# Patient Record
Sex: FEMALE | Race: WHITE | Employment: OTHER | URBAN - METROPOLITAN AREA
[De-identification: names, ages, dates, MRNs, and addresses within clinical notes are randomized per-mention and may not be internally consistent; named-entity substitution may affect disease eponyms.]

---

## 2017-10-16 PROBLEM — E55.9 VITAMIN D DEFICIENCY: Status: ACTIVE | Noted: 2017-10-16

## 2017-10-16 PROBLEM — E11.9 DIABETES MELLITUS (HCC): Status: ACTIVE | Noted: 2017-10-16

## 2017-10-16 PROBLEM — K21.9 GASTROESOPHAGEAL REFLUX DISEASE: Status: ACTIVE | Noted: 2017-10-16

## 2017-10-16 PROBLEM — E78.5 HYPERLIPIDEMIA: Status: ACTIVE | Noted: 2017-10-16

## 2018-05-29 ENCOUNTER — HOSPITAL ENCOUNTER (OUTPATIENT)
Dept: MAMMOGRAPHY | Age: 58
Discharge: HOME | End: 2018-05-31
Payer: COMMERCIAL

## 2018-05-29 ENCOUNTER — HOSPITAL ENCOUNTER (OUTPATIENT)
Dept: ULTRASOUND IMAGING | Age: 58
Discharge: HOME | End: 2018-05-29
Payer: COMMERCIAL

## 2018-05-29 ENCOUNTER — HOSPITAL ENCOUNTER (OUTPATIENT)
Age: 58
Discharge: HOME | End: 2018-05-31
Payer: COMMERCIAL

## 2018-05-29 ENCOUNTER — HOSPITAL ENCOUNTER (OUTPATIENT)
Age: 58
Discharge: HOME | End: 2018-05-29
Payer: COMMERCIAL

## 2018-05-29 DIAGNOSIS — E11.9 TYPE 2 DIABETES MELLITUS WITHOUT COMPLICATION, WITHOUT LONG-TERM CURRENT USE OF INSULIN (HCC): ICD-10-CM

## 2018-05-29 DIAGNOSIS — N63.25 BREAST LUMP ON LEFT SIDE AT 6 O'CLOCK POSITION: ICD-10-CM

## 2018-05-29 DIAGNOSIS — E78.2 MIXED HYPERLIPIDEMIA: ICD-10-CM

## 2018-05-29 DIAGNOSIS — N63.20 LUMP OF BREAST, LEFT: ICD-10-CM

## 2018-05-29 LAB
ALBUMIN SERPL-MCNC: 3.8 G/DL (ref 3.5–5.2)
ALP BLD-CCNC: 99 U/L (ref 35–104)
ALT SERPL-CCNC: 18 U/L (ref 0–32)
ANION GAP SERPL CALCULATED.3IONS-SCNC: 11 MMOL/L (ref 7–16)
AST SERPL-CCNC: 16 U/L (ref 0–31)
BILIRUB SERPL-MCNC: 0.4 MG/DL (ref 0–1.2)
BUN BLDV-MCNC: 14 MG/DL (ref 6–20)
CALCIUM SERPL-MCNC: 9.8 MG/DL (ref 8.6–10.2)
CHLORIDE BLD-SCNC: 99 MMOL/L (ref 98–107)
CHOLESTEROL, TOTAL: 203 MG/DL (ref 0–199)
CO2: 31 MMOL/L (ref 22–29)
CREAT SERPL-MCNC: 0.6 MG/DL (ref 0.5–1)
GFR AFRICAN AMERICAN: >60
GFR NON-AFRICAN AMERICAN: >60 ML/MIN/1.73
GLUCOSE BLD-MCNC: 387 MG/DL (ref 74–109)
HBA1C MFR BLD: 11.6 % (ref 4.8–5.9)
HDLC SERPL-MCNC: 49 MG/DL
LDL CHOLESTEROL CALCULATED: 108 MG/DL (ref 0–99)
POTASSIUM SERPL-SCNC: 4.5 MMOL/L (ref 3.5–5)
SODIUM BLD-SCNC: 141 MMOL/L (ref 132–146)
TOTAL PROTEIN: 7.9 G/DL (ref 6.4–8.3)
TRIGL SERPL-MCNC: 230 MG/DL (ref 0–149)
VLDLC SERPL CALC-MCNC: 46 MG/DL

## 2018-05-29 PROCEDURE — 80053 COMPREHEN METABOLIC PANEL: CPT

## 2018-05-29 PROCEDURE — 77066 DX MAMMO INCL CAD BI: CPT

## 2018-05-29 PROCEDURE — 80061 LIPID PANEL: CPT

## 2018-05-29 PROCEDURE — 76642 ULTRASOUND BREAST LIMITED: CPT

## 2018-05-29 PROCEDURE — 36415 COLL VENOUS BLD VENIPUNCTURE: CPT

## 2018-05-29 PROCEDURE — 83036 HEMOGLOBIN GLYCOSYLATED A1C: CPT

## 2018-05-30 ENCOUNTER — HOSPITAL ENCOUNTER (OUTPATIENT)
Dept: MAMMOGRAPHY | Age: 58
Discharge: HOME | End: 2018-06-01
Payer: COMMERCIAL

## 2019-02-05 ENCOUNTER — HOSPITAL ENCOUNTER (OUTPATIENT)
Age: 59
Discharge: HOME | End: 2019-02-07
Payer: COMMERCIAL

## 2019-02-05 ENCOUNTER — NURSE ONLY (OUTPATIENT)
Dept: FAMILY MEDICINE CLINIC | Age: 59
End: 2019-02-05

## 2019-02-05 DIAGNOSIS — E11.9 TYPE 2 DIABETES MELLITUS WITHOUT COMPLICATION, WITHOUT LONG-TERM CURRENT USE OF INSULIN (HCC): ICD-10-CM

## 2019-02-05 LAB
ANION GAP SERPL CALCULATED.3IONS-SCNC: 13 MMOL/L (ref 7–16)
BUN BLDV-MCNC: 16 MG/DL (ref 6–20)
CALCIUM SERPL-MCNC: 9.3 MG/DL (ref 8.6–10.2)
CHLORIDE BLD-SCNC: 105 MMOL/L (ref 98–107)
CO2: 27 MMOL/L (ref 22–29)
CREAT SERPL-MCNC: 0.7 MG/DL (ref 0.5–1)
GFR AFRICAN AMERICAN: >60
GFR NON-AFRICAN AMERICAN: >60 ML/MIN/1.73
GLUCOSE BLD-MCNC: 152 MG/DL (ref 74–99)
HBA1C MFR BLD: 7.3 % (ref 4–5.6)
POTASSIUM SERPL-SCNC: 4.5 MMOL/L (ref 3.5–5)
SODIUM BLD-SCNC: 145 MMOL/L (ref 132–146)

## 2019-02-05 PROCEDURE — 83036 HEMOGLOBIN GLYCOSYLATED A1C: CPT

## 2019-02-05 PROCEDURE — 80048 BASIC METABOLIC PNL TOTAL CA: CPT

## 2019-10-17 ENCOUNTER — HOSPITAL ENCOUNTER (OUTPATIENT)
Age: 59
Discharge: HOME | End: 2019-10-19
Payer: COMMERCIAL

## 2019-10-17 DIAGNOSIS — E78.2 MIXED HYPERLIPIDEMIA: ICD-10-CM

## 2019-10-17 DIAGNOSIS — E11.9 TYPE 2 DIABETES MELLITUS WITHOUT COMPLICATION, WITHOUT LONG-TERM CURRENT USE OF INSULIN (HCC): ICD-10-CM

## 2019-10-17 LAB
ALBUMIN SERPL-MCNC: 4 G/DL (ref 3.5–5.2)
ALP BLD-CCNC: 99 U/L (ref 35–104)
ALT SERPL-CCNC: 15 U/L (ref 0–32)
ANION GAP SERPL CALCULATED.3IONS-SCNC: 16 MMOL/L (ref 7–16)
AST SERPL-CCNC: 17 U/L (ref 0–31)
BILIRUB SERPL-MCNC: 0.3 MG/DL (ref 0–1.2)
BUN BLDV-MCNC: 14 MG/DL (ref 6–20)
CALCIUM SERPL-MCNC: 9.7 MG/DL (ref 8.6–10.2)
CHLORIDE BLD-SCNC: 100 MMOL/L (ref 98–107)
CO2: 24 MMOL/L (ref 22–29)
CREAT SERPL-MCNC: 0.6 MG/DL (ref 0.5–1)
CREATININE URINE: 30 MG/DL (ref 29–226)
GFR AFRICAN AMERICAN: >60
GFR NON-AFRICAN AMERICAN: >60 ML/MIN/1.73
GLUCOSE BLD-MCNC: 257 MG/DL (ref 74–99)
HBA1C MFR BLD: 9.9 % (ref 4–5.6)
MICROALBUMIN UR-MCNC: 24.9 MG/L
MICROALBUMIN/CREAT UR-RTO: 83 (ref 0–30)
POTASSIUM SERPL-SCNC: 4.3 MMOL/L (ref 3.5–5)
SODIUM BLD-SCNC: 140 MMOL/L (ref 132–146)
TOTAL PROTEIN: 9.1 G/DL (ref 6.4–8.3)

## 2019-10-17 PROCEDURE — 80053 COMPREHEN METABOLIC PANEL: CPT

## 2019-10-17 PROCEDURE — 83036 HEMOGLOBIN GLYCOSYLATED A1C: CPT

## 2019-10-17 PROCEDURE — 82570 ASSAY OF URINE CREATININE: CPT

## 2019-10-17 PROCEDURE — 36415 COLL VENOUS BLD VENIPUNCTURE: CPT

## 2019-10-17 PROCEDURE — 82044 UR ALBUMIN SEMIQUANTITATIVE: CPT

## 2019-11-19 ENCOUNTER — HOSPITAL ENCOUNTER (OUTPATIENT)
Dept: WOUND CARE | Age: 59
Discharge: HOME | End: 2019-11-19
Payer: COMMERCIAL

## 2019-11-19 DIAGNOSIS — I89.0 LYMPHEDEMA OF LEFT LEG: ICD-10-CM

## 2019-11-19 DIAGNOSIS — I83.019 VENOUS ULCER OF BOTH LOWER EXTREMITIES WITH VARICOSE VEINS (HCC): ICD-10-CM

## 2019-11-19 DIAGNOSIS — L97.922 ULCER OF LEFT LOWER LEG, WITH FAT LAYER EXPOSED (HCC): ICD-10-CM

## 2019-11-19 DIAGNOSIS — L97.912 ULCER OF RIGHT LEG, WITH FAT LAYER EXPOSED (HCC): Primary | ICD-10-CM

## 2019-11-19 DIAGNOSIS — L97.929 VENOUS ULCER OF BOTH LOWER EXTREMITIES WITH VARICOSE VEINS (HCC): ICD-10-CM

## 2019-11-19 DIAGNOSIS — I83.029 VENOUS ULCER OF BOTH LOWER EXTREMITIES WITH VARICOSE VEINS (HCC): ICD-10-CM

## 2019-11-19 DIAGNOSIS — L97.919 VENOUS ULCER OF BOTH LOWER EXTREMITIES WITH VARICOSE VEINS (HCC): ICD-10-CM

## 2019-11-19 PROCEDURE — 87186 SC STD MICRODIL/AGAR DIL: CPT

## 2019-11-19 PROCEDURE — 87075 CULTR BACTERIA EXCEPT BLOOD: CPT

## 2019-11-19 PROCEDURE — 11042 DBRDMT SUBQ TIS 1ST 20SQCM/<: CPT

## 2019-11-19 PROCEDURE — 87147 CULTURE TYPE IMMUNOLOGIC: CPT

## 2019-11-19 PROCEDURE — 87070 CULTURE OTHR SPECIMN AEROBIC: CPT

## 2019-11-19 PROCEDURE — 99213 OFFICE O/P EST LOW 20 MIN: CPT

## 2019-11-19 PROCEDURE — 87077 CULTURE AEROBIC IDENTIFY: CPT

## 2019-11-19 RX ORDER — TRIAMCINOLONE ACETONIDE 1 MG/G
CREAM TOPICAL DAILY
COMMUNITY
End: 2020-10-20

## 2019-11-21 LAB — ANAEROBIC CULTURE: NORMAL

## 2019-11-23 LAB
ORGANISM: ABNORMAL
ORGANISM: ABNORMAL
WOUND/ABSCESS: ABNORMAL

## 2019-12-03 ENCOUNTER — HOSPITAL ENCOUNTER (OUTPATIENT)
Dept: WOUND CARE | Age: 59
Discharge: HOME | End: 2019-12-03
Payer: COMMERCIAL

## 2019-12-03 VITALS
SYSTOLIC BLOOD PRESSURE: 152 MMHG | WEIGHT: 280 LBS | TEMPERATURE: 97.7 F | HEIGHT: 64 IN | DIASTOLIC BLOOD PRESSURE: 84 MMHG | BODY MASS INDEX: 47.8 KG/M2 | RESPIRATION RATE: 18 BRPM | HEART RATE: 72 BPM

## 2019-12-03 DIAGNOSIS — L97.919 VENOUS ULCER OF BOTH LOWER EXTREMITIES WITH VARICOSE VEINS (HCC): ICD-10-CM

## 2019-12-03 DIAGNOSIS — I83.029 VENOUS ULCER OF BOTH LOWER EXTREMITIES WITH VARICOSE VEINS (HCC): ICD-10-CM

## 2019-12-03 DIAGNOSIS — L97.929 VENOUS ULCER OF BOTH LOWER EXTREMITIES WITH VARICOSE VEINS (HCC): ICD-10-CM

## 2019-12-03 DIAGNOSIS — L97.922 ULCER OF LEFT LOWER LEG, WITH FAT LAYER EXPOSED (HCC): ICD-10-CM

## 2019-12-03 DIAGNOSIS — L97.912 ULCER OF RIGHT LEG, WITH FAT LAYER EXPOSED (HCC): ICD-10-CM

## 2019-12-03 DIAGNOSIS — I83.019 VENOUS ULCER OF BOTH LOWER EXTREMITIES WITH VARICOSE VEINS (HCC): ICD-10-CM

## 2019-12-03 DIAGNOSIS — I89.0 LYMPHEDEMA OF LEFT LEG: Primary | ICD-10-CM

## 2019-12-03 PROCEDURE — 11042 DBRDMT SUBQ TIS 1ST 20SQCM/<: CPT

## 2019-12-03 RX ORDER — LIDOCAINE HYDROCHLORIDE 20 MG/ML
JELLY TOPICAL ONCE
Status: DISCONTINUED | OUTPATIENT
Start: 2019-12-03 | End: 2019-12-04 | Stop reason: HOSPADM

## 2019-12-03 ASSESSMENT — PAIN DESCRIPTION - FREQUENCY: FREQUENCY: INTERMITTENT

## 2019-12-03 ASSESSMENT — PAIN DESCRIPTION - ONSET: ONSET: ON-GOING

## 2019-12-03 ASSESSMENT — PAIN DESCRIPTION - ORIENTATION: ORIENTATION: LEFT

## 2019-12-03 ASSESSMENT — PAIN SCALES - GENERAL: PAINLEVEL_OUTOF10: 2

## 2019-12-03 ASSESSMENT — PAIN DESCRIPTION - LOCATION: LOCATION: LEG

## 2019-12-03 ASSESSMENT — PAIN DESCRIPTION - PAIN TYPE: TYPE: ACUTE PAIN

## 2019-12-03 ASSESSMENT — PAIN DESCRIPTION - DESCRIPTORS: DESCRIPTORS: SHOOTING;SHARP

## 2019-12-10 ENCOUNTER — HOSPITAL ENCOUNTER (OUTPATIENT)
Dept: WOUND CARE | Age: 59
Discharge: HOME | End: 2019-12-10
Payer: COMMERCIAL

## 2019-12-10 VITALS
TEMPERATURE: 98.7 F | RESPIRATION RATE: 16 BRPM | DIASTOLIC BLOOD PRESSURE: 82 MMHG | HEART RATE: 82 BPM | SYSTOLIC BLOOD PRESSURE: 154 MMHG

## 2019-12-10 PROCEDURE — 11042 DBRDMT SUBQ TIS 1ST 20SQCM/<: CPT

## 2019-12-10 RX ORDER — LIDOCAINE HYDROCHLORIDE 20 MG/ML
JELLY TOPICAL ONCE
Status: DISCONTINUED | OUTPATIENT
Start: 2019-12-10 | End: 2019-12-11 | Stop reason: HOSPADM

## 2019-12-17 ENCOUNTER — HOSPITAL ENCOUNTER (OUTPATIENT)
Dept: WOUND CARE | Age: 59
Discharge: HOME | End: 2019-12-17
Payer: COMMERCIAL

## 2019-12-17 VITALS
HEART RATE: 80 BPM | DIASTOLIC BLOOD PRESSURE: 88 MMHG | TEMPERATURE: 98.7 F | SYSTOLIC BLOOD PRESSURE: 150 MMHG | RESPIRATION RATE: 20 BRPM

## 2019-12-17 DIAGNOSIS — L97.212 VENOUS STASIS ULCER OF RIGHT CALF WITH FAT LAYER EXPOSED, UNSPECIFIED WHETHER VARICOSE VEINS PRESENT (HCC): ICD-10-CM

## 2019-12-17 DIAGNOSIS — I83.022 VENOUS STASIS ULCER OF LEFT CALF WITH FAT LAYER EXPOSED, UNSPECIFIED WHETHER VARICOSE VEINS PRESENT (HCC): Primary | ICD-10-CM

## 2019-12-17 DIAGNOSIS — L97.929 VENOUS ULCER OF BOTH LOWER EXTREMITIES WITH VARICOSE VEINS (HCC): ICD-10-CM

## 2019-12-17 DIAGNOSIS — I83.019 VENOUS ULCER OF BOTH LOWER EXTREMITIES WITH VARICOSE VEINS (HCC): ICD-10-CM

## 2019-12-17 DIAGNOSIS — L97.222 VENOUS STASIS ULCER OF LEFT CALF WITH FAT LAYER EXPOSED, UNSPECIFIED WHETHER VARICOSE VEINS PRESENT (HCC): Primary | ICD-10-CM

## 2019-12-17 DIAGNOSIS — I83.029 VENOUS ULCER OF BOTH LOWER EXTREMITIES WITH VARICOSE VEINS (HCC): ICD-10-CM

## 2019-12-17 DIAGNOSIS — L97.919 VENOUS ULCER OF BOTH LOWER EXTREMITIES WITH VARICOSE VEINS (HCC): ICD-10-CM

## 2019-12-17 DIAGNOSIS — I83.012 VENOUS STASIS ULCER OF RIGHT CALF WITH FAT LAYER EXPOSED, UNSPECIFIED WHETHER VARICOSE VEINS PRESENT (HCC): ICD-10-CM

## 2019-12-17 DIAGNOSIS — L97.922 ULCER OF LEFT LOWER LEG, WITH FAT LAYER EXPOSED (HCC): ICD-10-CM

## 2019-12-17 DIAGNOSIS — L97.912 ULCER OF RIGHT LEG, WITH FAT LAYER EXPOSED (HCC): ICD-10-CM

## 2019-12-17 PROCEDURE — 11042 DBRDMT SUBQ TIS 1ST 20SQCM/<: CPT

## 2019-12-17 RX ORDER — LIDOCAINE HYDROCHLORIDE 20 MG/ML
JELLY TOPICAL ONCE
Status: DISCONTINUED | OUTPATIENT
Start: 2019-12-17 | End: 2019-12-18 | Stop reason: HOSPADM

## 2019-12-17 RX ORDER — GENTAMICIN SULFATE 1 MG/G
OINTMENT TOPICAL DAILY
COMMUNITY
End: 2020-07-21

## 2019-12-17 RX ORDER — GENTAMICIN SULFATE 1 MG/G
OINTMENT TOPICAL
Qty: 30 G | Refills: 1 | Status: SHIPPED | OUTPATIENT
Start: 2019-12-17 | End: 2019-12-24

## 2019-12-17 RX ORDER — PRENATAL VIT 91/IRON/FOLIC/DHA 28-975-200
COMBINATION PACKAGE (EA) ORAL
Qty: 30 G | Refills: 1 | Status: SHIPPED | OUTPATIENT
Start: 2019-12-17 | End: 2019-12-31 | Stop reason: SDUPTHER

## 2019-12-17 RX ORDER — PRENATAL VIT 91/IRON/FOLIC/DHA 28-975-200
COMBINATION PACKAGE (EA) ORAL DAILY
COMMUNITY
End: 2020-10-20

## 2019-12-17 RX ORDER — TRIAMCINOLONE ACETONIDE 1 MG/ML
LOTION TOPICAL
Qty: 1 BOTTLE | Refills: 1 | Status: SHIPPED | OUTPATIENT
Start: 2019-12-17 | End: 2019-12-24

## 2019-12-17 ASSESSMENT — PAIN SCALES - GENERAL: PAINLEVEL_OUTOF10: 0

## 2019-12-27 ENCOUNTER — HOSPITAL ENCOUNTER (OUTPATIENT)
Dept: INTERVENTIONAL RADIOLOGY/VASCULAR | Age: 59
Discharge: HOME | End: 2019-12-29
Payer: COMMERCIAL

## 2019-12-27 DIAGNOSIS — L97.212 VENOUS STASIS ULCER OF RIGHT CALF WITH FAT LAYER EXPOSED, UNSPECIFIED WHETHER VARICOSE VEINS PRESENT (HCC): ICD-10-CM

## 2019-12-27 DIAGNOSIS — I83.012 VENOUS STASIS ULCER OF RIGHT CALF WITH FAT LAYER EXPOSED, UNSPECIFIED WHETHER VARICOSE VEINS PRESENT (HCC): ICD-10-CM

## 2019-12-27 DIAGNOSIS — L97.222 VENOUS STASIS ULCER OF LEFT CALF WITH FAT LAYER EXPOSED, UNSPECIFIED WHETHER VARICOSE VEINS PRESENT (HCC): ICD-10-CM

## 2019-12-27 DIAGNOSIS — I83.022 VENOUS STASIS ULCER OF LEFT CALF WITH FAT LAYER EXPOSED, UNSPECIFIED WHETHER VARICOSE VEINS PRESENT (HCC): ICD-10-CM

## 2019-12-27 PROCEDURE — 93922 UPR/L XTREMITY ART 2 LEVELS: CPT

## 2019-12-31 ENCOUNTER — HOSPITAL ENCOUNTER (OUTPATIENT)
Dept: WOUND CARE | Age: 59
Discharge: HOME | End: 2019-12-31
Payer: COMMERCIAL

## 2019-12-31 VITALS
SYSTOLIC BLOOD PRESSURE: 148 MMHG | DIASTOLIC BLOOD PRESSURE: 76 MMHG | BODY MASS INDEX: 47.8 KG/M2 | HEIGHT: 64 IN | RESPIRATION RATE: 18 BRPM | TEMPERATURE: 98.1 F | WEIGHT: 280 LBS | HEART RATE: 76 BPM

## 2019-12-31 PROCEDURE — 11042 DBRDMT SUBQ TIS 1ST 20SQCM/<: CPT

## 2019-12-31 RX ORDER — LIDOCAINE HYDROCHLORIDE 20 MG/ML
JELLY TOPICAL ONCE
Status: DISCONTINUED | OUTPATIENT
Start: 2019-12-31 | End: 2020-01-01 | Stop reason: HOSPADM

## 2020-01-07 ENCOUNTER — HOSPITAL ENCOUNTER (OUTPATIENT)
Dept: WOUND CARE | Age: 60
Discharge: HOME | End: 2020-01-07
Payer: COMMERCIAL

## 2020-01-07 VITALS
DIASTOLIC BLOOD PRESSURE: 84 MMHG | SYSTOLIC BLOOD PRESSURE: 142 MMHG | HEART RATE: 72 BPM | TEMPERATURE: 98.6 F | RESPIRATION RATE: 18 BRPM

## 2020-01-07 PROCEDURE — 11042 DBRDMT SUBQ TIS 1ST 20SQCM/<: CPT

## 2020-01-07 RX ORDER — LIDOCAINE HYDROCHLORIDE 20 MG/ML
JELLY TOPICAL ONCE
Status: DISCONTINUED | OUTPATIENT
Start: 2020-01-07 | End: 2020-01-08 | Stop reason: HOSPADM

## 2020-01-07 NOTE — PROGRESS NOTES
[facility]  History and Physical/Consultation  Podiatry    Referring Physician : Selene Khan DO  Rolf Harris  MEDICAL RECORD NUMBER:  [id number]  AGE: 64 y.o. GENDER: female  : 1960  EPISODE DATE:  2020  Subjective:     Chief Complaint   Patient presents with    Wound Check     wounds bilateral legs         HISTORY of PRESENT ILLNESS HPI     Rolf Harris is a 64 y.o. female who presents today for wound/ulcer evaluation. History of Wound Context:  The patient has had a wound of b/l legs which was first noted approximately 4 months ago. This has been treated most recently with compression wrap to right leg and dressing changes to Left with compression stocking s/p venous intervention per Dr. Ritchie Oropeza. Patient's b/l LE ulcerations have improved. Patient states that the wrapping to the right leg has not caused any problems. The patient has had wounds similar to this in the past.        Pt is currently not on abx.       Wound/Ulcer Pain Timing/Severity: intermittent  Quality of pain: burning  Severity:  5 / 10   Modifying Factors: Pain worsens with walking  Associated Signs/Symptoms: edema    Ulcer Identification:  Ulcer Type: venous  Contributing Factors: venous stasis and lymphedema    Diabetic/Pressure/Non Pressure Ulcers onl y:  Ulcer: Non-Pressure ulcer, fat layer exposed    If patient has diabetic lower extremity wounds  Newton Classification of diabetic lower extremity wounds:    Grade Description   []  0 No open wound   []  1 Superficial ulcer involving the full skin thickness   []  2 Deep ulcer involves ligament, tendon, joint capsule, or fascia  No bone involvement or abscess presence   []  3 Deep Ulcer with abcess formation and/or osteomyelitis   []  4 Localized gangrene   []  5 Extensive gangrene of the foot     Wound: N/A        PAST MEDICAL HISTORY      Diagnosis Date    [facility] mellitus ([facility] [address]     Hypertension     Lumbago     Mononeuritis     Obesity     Pain in right foot     Sciatica, right side     Varicose veins of left lower extremities with other complications      Past Surgical History:   Procedure Laterality Date     SECTION      VASCULAR SURGERY      Vein Laser      Family History   Problem Relation Age of Onset    High Blood Pressure Mother     Other Father      Social History     Tobacco Use    Smoking status: Never Smoker    Smokeless tobacco: Never Used   Substance Use Topics    Alcohol use: No     Comment: Social     Drug use: No     Allergies   Allergen Reactions    Acetaminophen     Doxycycline     Nsaids      Bleeding stomach    Iodine     Keflex [Cephalexin] Hives    Zithromax [Azithromycin] Hives     Current Outpatient Medications on File Prior to Encounter   Medication Sig Dispense Refill    valsartan-hydrochlorothiazide (DIOVAN-HCT) 320-12.5 MG per tablet Take 320 tablets by mouth daily 90 tablet 0    SITagliptin (JANUVIA) 100 MG tablet Take 1 tablet by mouth daily 90 tablet 0    amLODIPine (NORVASC) 5 MG tablet Take 1 tablet by mouth daily 90 tablet 0    metFORMIN (GLUCOPHAGE) 1000 MG tablet Take 1 tablet by mouth 2 times daily (with meals) 180 tablet 0    omeprazole 20 MG EC tablet Take 1 tablet by mouth daily 90 tablet 0    naproxen-esomeprazole (VIMOVO) 500-20 MG TBEC Take 1 tablet by mouth 2 times daily 60 tablet 2    Blood Glucose Monitoring Suppl (ONE TOUCH ULTRA 2) w/Device KIT 1 kit by Does not apply route daily as needed      naproxen (NAPROSYN) 500 MG tablet Take 1 tablet by mouth daily 90 tablet 0    gentamicin (GARAMYCIN) 0.1 % ointment Apply topically daily Apply topically daily.  terbinafine (LAMISIL) 1 % cream Apply topically daily Apply topically daily.  triamcinolone (KENALOG) 0.1 % cream Apply topically daily Apply topically around the wound as directed       No current facility-administered medications on file prior to encounter.         REVIEW OF SYSTEMS   ROS : All others Negative if blank [], Positive if [x]  General Vascular   [] Fevers [] Claudication   [] Chills [] Rest Pain   Skin Neurologic   [x] Tissue Loss [] Lower extremity neuropathy     Objective:    BP (!) 142/84   Pulse 72   Temp 98.6 °F (37 °C) (Oral)   Resp 18   Wt Readings from Last 3 Encounters:   12/31/19 280 lb (127 kg)   12/03/19 280 lb (127 kg)   10/21/19 297 lb 11.2 oz (135 kg)       PHYSICAL EXAM   CONSTITUTIONAL:   Awake, alert, cooperative   PSYCHIATRIC :  Oriented to time, place and person      normal insight to disease process  EXTREMITIES:   R LE Open wounds are noted. Full thickness ulceration with 100% granulation. Capillary budding. No proximal streaking, malodor or purulence. Skin color is abnormal with hemosiderin deposition and chronic stasis changes   Edema is  noted   Sensation intact to light touch   Palpation of the foot does cause pain   5/5 strength DF/PF  L LE Open wounds are noted   Skin color is  abnormal with hemosiderin deposition and chronic stasis changes. Chronic erythema noted to left leg, unchanged since previous visit. Edema is  noted   Sensation intact to light touch   Palpation of the foot does cause pain   5/5 strength DF/PF  R dorsalis pedis 1/4 L dorsalis pedis 1/4   R posterior tibial 1/4 L posterior tibial 1/4     Assessment:     Problem List Items Addressed This Visit     None      b/l venous ulcerations, full thickness  Lymphedema  Venous stasis    Pre Debridement Measurements:  Are located in the [facility]  Documentation Flow Sheet  Post Debridement Measurements:  Wound/Ulcer Descriptions are Pre Debridement except measurements:     Wound 11/19/19 Ankle Right;Medial #1 Right Medial Ankle / Venous Ulcer/ (Acquired August 2019) (Active)   Wound Image   11/19/2019 11:36 AM   Dressing Status Clean;Dry; Intact 12/31/2019 11:03 AM   Dressing Changed Changed/New 12/31/2019 11:03 AM   Dressing/Treatment Collagen;Alginate;Dry dressing 12/31/2019 11:03 AM   Wound Cleansed Rinsed/Irrigated with

## 2020-01-08 PROBLEM — L97.919 VENOUS ULCER OF BOTH LOWER EXTREMITIES WITH VARICOSE VEINS (HCC): Status: ACTIVE | Noted: 2020-01-08

## 2020-01-08 PROBLEM — L97.922 ULCER OF LEFT LOWER LEG, WITH FAT LAYER EXPOSED (HCC): Status: ACTIVE | Noted: 2020-01-08

## 2020-01-08 PROBLEM — L97.912 ULCER OF RIGHT LEG, WITH FAT LAYER EXPOSED (HCC): Status: ACTIVE | Noted: 2020-01-08

## 2020-01-08 PROBLEM — L97.929 VENOUS ULCER OF BOTH LOWER EXTREMITIES WITH VARICOSE VEINS (HCC): Status: ACTIVE | Noted: 2020-01-08

## 2020-01-08 PROBLEM — I83.019 VENOUS ULCER OF BOTH LOWER EXTREMITIES WITH VARICOSE VEINS (HCC): Status: ACTIVE | Noted: 2020-01-08

## 2020-01-08 PROBLEM — I83.029 VENOUS ULCER OF BOTH LOWER EXTREMITIES WITH VARICOSE VEINS (HCC): Status: ACTIVE | Noted: 2020-01-08

## 2020-01-08 NOTE — PROGRESS NOTES
[facility]  History and Physical/Consultation  Podiatry    Referring Physician : Sienna Enriquez DO  Carlyn Stevens  MEDICAL RECORD NUMBER:  [id number]  AGE: 64 y.o. GENDER: female  : 1960  EPISODE DATE:  2019  Subjective:     Chief Complaint   Patient presents with    Wound Check     right medial ankle /left lower leg         HISTORY of PRESENT ILLNESS GUNNER Stevens is a 64 y.o. female who presents today for wound/ulcer evaluation. History of Wound Context:  The patient has had a wound of b/l legs which was first noted approximately 4 months ago. This has been treated most recently with compression wrap to right leg and dressing changes to Left with compression stocking s/p venous intervention per Dr. Marguerite Teran. Patient's b/l LE ulcerations have improved. Patient states that the wrapping to the right leg has not caused any problems. The patient has had wounds similar to this in the past.         Pt is currently not on abx. Wound/Ulcer Pain Timing/Severity: intermittent  Quality of pain: burning  Severity:  5 / 10   Modifying Factors: Pain worsens with walking  Associated Signs/Symptoms: edema     Ulcer Identification:  Ulcer Type: venous  Contributing Factors: venous stasis and lymphedema     Diabetic/Pressure/Non Pressure Ulcers onl y:  Ulcer: Non-Pressure ulcer, fat layer exposed     If patient has diabetic lower extremity wounds  Newton Classification of diabetic lower extremity wounds:     Grade Description   []? 0 No open wound   []? 1 Superficial ulcer involving the full skin thickness   []? 2 Deep ulcer involves ligament, tendon, joint capsule, or fascia  No bone involvement or abscess presence   []? 3 Deep Ulcer with abcess formation and/or osteomyelitis   []? 4 Localized gangrene   []?   5 Extensive gangrene of the foot      Wound: N/A                                        PAST MEDICAL HISTORY  Past Medical History             Diagnosis Date    Diabetes topically daily Apply topically around the wound as directed          No current facility-administered medications on file prior to encounter.          REVIEW OF SYSTEMS   ROS : All others Negative if blank []?, Positive if [x]? General Vascular   []? Fevers []? Claudication   []? Chills []? Rest Pain   Skin Neurologic   [x]? Tissue Loss []? Lower extremity neuropathy      Objective:    BP (!) 142/84   Pulse 72   Temp 98.6 °F (37 °C) (Oral)   Resp 18       Wt Readings from Last 3 Encounters:   12/31/19 280 lb (127 kg)   12/03/19 280 lb (127 kg)   10/21/19 297 lb 11.2 oz (135 kg)         PHYSICAL EXAM   CONSTITUTIONAL:   Awake, alert, cooperative   PSYCHIATRIC :          Oriented to time, place and person                                       normal insight to disease process  EXTREMITIES:   R LE    Open wounds are noted. Full thickness ulceration with 100% granulation. Capillary budding. No proximal streaking, malodor or purulence. Skin color is abnormal with hemosiderin deposition and chronic stasis changes              Edema is  noted              Sensation intact to light touch              Palpation of the foot does cause pain              5/5 strength DF/PF  L LE     Open wounds are noted              Skin color is  abnormal with hemosiderin deposition and chronic stasis changes. Chronic erythema noted to left leg, unchanged since previous visit.               Edema is  noted              Sensation intact to light touch              Palpation of the foot does cause pain              5/5 strength DF/PF  R dorsalis pedis 1/4 L dorsalis pedis 1/4   R posterior tibial 1/4 L posterior tibial 1/4      Assessment:         Problem List Items Addressed This Visit     Ulcer of right leg, with fat layer exposed ([address]    Ulcer of left lower leg, with fat layer exposed ([address]    Venous ulcer of both lower extremities with varicose veins ([address]          Pre Debridement Measurements:  Are located in the Wound/Ulcer Documentation Flow Sheet  Post Debridement Measurements:  Wound/Ulcer Descriptions are Pre Debridement except measurements:     Wound 11/19/19 Ankle Right;Medial #1 Right Medial Ankle / Venous Ulcer/ (Acquired August 2019) (Active)   Wound Image   11/19/2019 11:36 AM   Dressing Status Clean;Dry; Intact 12/31/2019 11:03 AM   Dressing Changed Changed/New 1/7/2020 11:43 AM   Dressing/Treatment Collagen;Vaseline gauze 1/7/2020 11:43 AM   Wound Cleansed Rinsed/Irrigated with saline 12/31/2019 11:03 AM   Wound Length (cm) 4 cm 1/7/2020 10:16 AM   Wound Width (cm) 1 cm 1/7/2020 10:16 AM   Wound Depth (cm) 0.1 cm 1/7/2020 10:16 AM   Wound Surface Area (cm^2) 4 cm^2 1/7/2020 10:16 AM   Change in Wound Size % (l*w) 70.37 1/7/2020 10:16 AM   Wound Volume (cm^3) 0.4 cm^3 1/7/2020 10:16 AM   Wound Healing % 70 1/7/2020 10:16 AM   Post-Procedure Length (cm) 4 cm 1/7/2020 11:03 AM   Post-Procedure Width (cm) 1 cm 1/7/2020 11:03 AM   Post-Procedure Depth (cm) 0.1 cm 1/7/2020 11:03 AM   Post-Procedure Surface Area (cm^2) 4 cm^2 1/7/2020 11:03 AM   Post-Procedure Volume (cm^3) 0.4 cm^3 1/7/2020 11:03 AM   Wound Assessment Drainage;Pink;Yellow 1/7/2020 10:16 AM   Drainage Amount Small 1/7/2020 10:16 AM   Drainage Description Serosanguinous; Yellow 1/7/2020 10:16 AM   Odor None 1/7/2020 10:16 AM   Raquel-wound Assessment Fragile; Intact 1/7/2020 10:16 AM   Number of days: 50       Wound 11/19/19 Leg Left #2 Left Lower Leg Venous Ulcer (Acquired August 2019)  (Active)   Wound Image   11/19/2019 11:36 AM   Dressing Status Clean;Dry; Intact 12/31/2019 11:03 AM   Dressing Changed Changed/New 1/7/2020 11:43 AM   Dressing/Treatment Alginate with Ag;Vaseline gauze 1/7/2020 11:43 AM   Wound Cleansed Rinsed/Irrigated with saline 12/31/2019 11:03 AM   Wound Length (cm) 10 cm 1/7/2020 10:16 AM   Wound Width (cm) 7 cm 1/7/2020 10:16 AM   Wound Depth (cm) 0.2 cm 1/7/2020 10:16 AM   Wound Surface Area (cm^2) 70 cm^2 1/7/2020 10:16 AM   Change 4:06 PM

## 2020-01-14 ENCOUNTER — HOSPITAL ENCOUNTER (OUTPATIENT)
Dept: WOUND CARE | Age: 60
Discharge: HOME | End: 2020-01-14
Payer: COMMERCIAL

## 2020-01-14 VITALS
HEIGHT: 64 IN | WEIGHT: 280 LBS | SYSTOLIC BLOOD PRESSURE: 140 MMHG | HEART RATE: 72 BPM | TEMPERATURE: 97.9 F | RESPIRATION RATE: 18 BRPM | BODY MASS INDEX: 47.8 KG/M2 | DIASTOLIC BLOOD PRESSURE: 80 MMHG

## 2020-01-14 PROBLEM — I89.0 LYMPHEDEMA OF LEFT LEG: Status: ACTIVE | Noted: 2020-01-14

## 2020-01-14 PROCEDURE — 11042 DBRDMT SUBQ TIS 1ST 20SQCM/<: CPT

## 2020-01-14 ASSESSMENT — PAIN SCALES - GENERAL: PAINLEVEL_OUTOF10: 0

## 2020-01-14 NOTE — PROGRESS NOTES
[facility]aling Center Followup Visit Note    Referring Physician : Sienna Enriquez DO  Carlyn Stevens  MEDICAL RECORD NUMBER:  [id number]  AGE: 64 y.o. GENDER: female  : 1960  EPISODE DATE:  2020    Subjective:     Chief Complaint   Patient presents with   [address] [facility] follow Up Appt with Dr Manuela Andre for Bilateral Lower Leg Wounds      HISTORY of PRESENT ILLNESS GUNNER Stevens is a 64 y.o. female who presents today in regards to follow up evaluation and treatment of wound/ulcer. That patient's past medical, family and social hx were reviewed and changes were made if present. History of Wound Context:  Patient has had venous ulcerations since 10/2019. She has been following with Dr. Marguerite Teran for venous procedures and is currently under his care. Patient has been having two layer compression dressings applied in office and at home through [facility].    19: Patient reports having removed dressing from [facility] Tuesday evening. It was reapplied by [facility] on Wednesday and she removed them again on Friday and has had them off since that time. Patient reports noticing an itchy rash on her right and left leg which began on Wednesday, which has improved today. She states that she has been wearing compression stockings. Only received one juxtalyte. Nursing to f/u with order.     Wound/Ulcer Pain Timing/Severity: intermittent  Quality of pain: sharp  Severity:  5 / 10   Modifying Factors: Pain worsens with debridement, pressure  Associated Signs/Symptoms: edema    Ulcer Identification:  Ulcer Type: venous  Contributing Factors: edema, venous stasis and lymphedema    Diabetic/Pressure/Non Pressure Ulcers only:  Ulcer: Non-Pressure ulcer, fat layer exposed    Wound: N/A        PAST MEDICAL HISTORY      Diagnosis Date    Diabetes mellitus ([facility] [address]     Hypertension     Lumbago     Mononeuritis     Obesity     Pain in right foot     Sciatica, right side     Varicose veins of left lower the nearest emergency room.                                                                                                                    Electronically signed by Sola Verde DPM on 1/14/2020 at 12:01 PM

## 2020-01-21 ENCOUNTER — HOSPITAL ENCOUNTER (OUTPATIENT)
Age: 60
Discharge: HOME | End: 2020-01-21
Payer: COMMERCIAL

## 2020-01-21 ENCOUNTER — HOSPITAL ENCOUNTER (OUTPATIENT)
Dept: WOUND CARE | Age: 60
Discharge: HOME | End: 2020-01-21
Payer: COMMERCIAL

## 2020-01-21 VITALS
RESPIRATION RATE: 18 BRPM | HEIGHT: 64 IN | DIASTOLIC BLOOD PRESSURE: 76 MMHG | TEMPERATURE: 98.7 F | HEART RATE: 76 BPM | WEIGHT: 280 LBS | BODY MASS INDEX: 47.8 KG/M2 | SYSTOLIC BLOOD PRESSURE: 142 MMHG

## 2020-01-21 LAB
ALBUMIN SERPL-MCNC: 4.2 G/DL (ref 3.5–5.2)
ALP BLD-CCNC: 96 U/L (ref 35–104)
ALT SERPL-CCNC: 20 U/L (ref 0–32)
ANION GAP SERPL CALCULATED.3IONS-SCNC: 16 MMOL/L (ref 7–16)
AST SERPL-CCNC: 19 U/L (ref 0–31)
BILIRUB SERPL-MCNC: 0.4 MG/DL (ref 0–1.2)
BUN BLDV-MCNC: 17 MG/DL (ref 6–20)
CALCIUM SERPL-MCNC: 9.6 MG/DL (ref 8.6–10.2)
CHLORIDE BLD-SCNC: 99 MMOL/L (ref 98–107)
CHOLESTEROL, TOTAL: 225 MG/DL (ref 0–199)
CO2: 26 MMOL/L (ref 22–29)
CREAT SERPL-MCNC: 0.6 MG/DL (ref 0.5–1)
GFR AFRICAN AMERICAN: >60
GFR NON-AFRICAN AMERICAN: >60 ML/MIN/1.73
GLUCOSE BLD-MCNC: 214 MG/DL (ref 74–99)
HBA1C MFR BLD: 10.3 % (ref 4–5.6)
HCT VFR BLD CALC: 43.3 % (ref 34–48)
HDLC SERPL-MCNC: 52 MG/DL
HEMOGLOBIN: 13.2 G/DL (ref 11.5–15.5)
LDL CHOLESTEROL CALCULATED: 134 MG/DL (ref 0–99)
MCH RBC QN AUTO: 26.6 PG (ref 26–35)
MCHC RBC AUTO-ENTMCNC: 30.5 % (ref 32–34.5)
MCV RBC AUTO: 87.1 FL (ref 80–99.9)
PDW BLD-RTO: 14.9 FL (ref 11.5–15)
PLATELET # BLD: 299 E9/L (ref 130–450)
PMV BLD AUTO: 9.7 FL (ref 7–12)
POTASSIUM SERPL-SCNC: 4.2 MMOL/L (ref 3.5–5)
RBC # BLD: 4.97 E12/L (ref 3.5–5.5)
SODIUM BLD-SCNC: 141 MMOL/L (ref 132–146)
TOTAL PROTEIN: 8.6 G/DL (ref 6.4–8.3)
TRIGL SERPL-MCNC: 197 MG/DL (ref 0–149)
TSH SERPL DL<=0.05 MIU/L-ACNC: 2.95 UIU/ML (ref 0.27–4.2)
VITAMIN D 25-HYDROXY: 23 NG/ML (ref 30–100)
VLDLC SERPL CALC-MCNC: 39 MG/DL
WBC # BLD: 7.1 E9/L (ref 4.5–11.5)

## 2020-01-21 PROCEDURE — 36415 COLL VENOUS BLD VENIPUNCTURE: CPT

## 2020-01-21 PROCEDURE — 82306 VITAMIN D 25 HYDROXY: CPT

## 2020-01-21 PROCEDURE — 80053 COMPREHEN METABOLIC PANEL: CPT

## 2020-01-21 PROCEDURE — 83036 HEMOGLOBIN GLYCOSYLATED A1C: CPT

## 2020-01-21 PROCEDURE — 11042 DBRDMT SUBQ TIS 1ST 20SQCM/<: CPT

## 2020-01-21 PROCEDURE — 80061 LIPID PANEL: CPT

## 2020-01-21 PROCEDURE — 85027 COMPLETE CBC AUTOMATED: CPT

## 2020-01-21 PROCEDURE — 84443 ASSAY THYROID STIM HORMONE: CPT

## 2020-01-21 RX ORDER — LIDOCAINE HYDROCHLORIDE 20 MG/ML
JELLY TOPICAL ONCE
Status: DISCONTINUED | OUTPATIENT
Start: 2020-01-21 | End: 2020-01-22 | Stop reason: HOSPADM

## 2020-01-21 NOTE — PROGRESS NOTES
PAST MEDICAL HISTORY      Diagnosis Date    [facility] mellitus ([facility] [address]     Hypertension     Lumbago     Mononeuritis     Obesity     Pain in right foot     Sciatica, right side     Varicose veins of left lower extremities with other complications      Past Surgical History:   Procedure Laterality Date     SECTION      VASCULAR SURGERY      Vein Laser      Family History   Problem Relation Age of Onset    High Blood Pressure Mother     Other Father      Social History     Tobacco Use    Smoking status: Never Smoker    Smokeless tobacco: Never Used   Substance Use Topics    Alcohol use: No     Comment: Social     Drug use: No     Allergies   Allergen Reactions    Acetaminophen     Doxycycline     Nsaids      Bleeding stomach    Iodine     Keflex [Cephalexin] Hives    Zithromax [Azithromycin] Hives     Current Outpatient Medications on File Prior to Encounter   Medication Sig Dispense Refill    gentamicin (GARAMYCIN) 0.1 % ointment Apply topically daily Apply topically daily.  terbinafine (LAMISIL) 1 % cream Apply topically daily Apply topically daily.       triamcinolone (KENALOG) 0.1 % cream Apply topically daily Apply topically around the wound as directed      valsartan-hydrochlorothiazide (DIOVAN-HCT) 320-12.5 MG per tablet Take 320 tablets by mouth daily 90 tablet 0    SITagliptin (JANUVIA) 100 MG tablet Take 1 tablet by mouth daily 90 tablet 0    amLODIPine (NORVASC) 5 MG tablet Take 1 tablet by mouth daily 90 tablet 0    metFORMIN (GLUCOPHAGE) 1000 MG tablet Take 1 tablet by mouth 2 times daily (with meals) 180 tablet 0    omeprazole 20 MG EC tablet Take 1 tablet by mouth daily 90 tablet 0    Blood Glucose Monitoring Suppl (ONE TOUCH ULTRA 2) w/Device KIT 1 kit by Does not apply route daily as needed      naproxen (NAPROSYN) 500 MG tablet Take 1 tablet by mouth daily 90 tablet 0    naproxen-esomeprazole (VIMOVO) 500-20 MG TBEC Take 1 tablet by mouth 2 times daily 60 tablet 2     No current facility-administered medications on file prior to encounter. REVIEW OF SYSTEMS See HPI    Objective:    BP (!) 142/76   Pulse 76   Temp 98.7 °F (37.1 °C) (Oral)   Resp 18   Ht 5' 4\" (1.626 m)   Wt 280 lb (127 kg)   BMI 48.06 kg/m²   Wt Readings from Last 3 Encounters:   01/21/20 280 lb (127 kg)   01/14/20 280 lb (127 kg)   12/31/19 280 lb (127 kg)     PHYSICAL EXAM  CONSTITUTIONAL:   Awake, alert, cooperative   EYES:  lids and lashes normal   ENT: external ears and nose without lesions   NECK:  supple, symmetrical, trachea midline   SKIN:  Open wound Present    Assessment:     Problem List Items Addressed This Visit     Ulcer of right leg, with fat layer exposed ([address]    Ulcer of left lower leg, with fat layer exposed ([address]    Venous ulcer of both lower extremities with varicose veins ([address] - Primary    Lymphedema of left leg          Pre Debridement Measurements:  Are located in the [facility]  Documentation Flow Sheet  Post Debridement Measurements:  Wound/Ulcer Descriptions are Pre Debridement except measurements:     Wound 11/19/19 Ankle Right;Medial #1 Right Medial Ankle / Venous Ulcer/ (Acquired August 2019) (Active)   Wound Image   1/21/2020 10:12 AM   Dressing Status Clean;Dry; Intact 1/21/2020 10:56 AM   Dressing Changed Changed/New 1/21/2020 10:56 AM   Dressing/Treatment ABD;Pharmaceutical agent (see MAR); Other (comment) 1/21/2020 10:56 AM   Wound Cleansed Rinsed/Irrigated with saline 1/21/2020 10:56 AM   Wound Length (cm) 2.2 cm 1/21/2020 10:12 AM   Wound Width (cm) 0.6 cm 1/21/2020 10:12 AM   Wound Depth (cm) 0.1 cm 1/21/2020 10:12 AM   Wound Surface Area (cm^2) 1.32 cm^2 1/21/2020 10:12 AM   Change in Wound Size % (l*w) 90.22 1/21/2020 10:12 AM   Wound Volume (cm^3) 0.13 cm^3 1/21/2020 10:12 AM   Wound Healing % 90 1/21/2020 10:12 AM   Post-Procedure Length (cm) 2.2 cm 1/21/2020 10:46 AM   Post-Procedure Width (cm) 0.6 cm 1/21/2020 10:46 AM   Post-Procedure Depth Temperature over 101  * Increase in drainage from your wound  * Drainage with a foul odor  * Bleeding  * Increase in swelling  * Need for compression bandage changes due to slippage, breakthrough drainage.     If you need medical attention outside of the business hours of the [address] [place] [address] Road please contact your PCP or go to the nearest emergency room.                                                                                                                                         Electronically signed by Supriya Lay DPM on 1/21/2020 at 10:59 AM

## 2020-01-28 ENCOUNTER — HOSPITAL ENCOUNTER (OUTPATIENT)
Dept: WOUND CARE | Age: 60
Discharge: HOME | End: 2020-01-28
Payer: COMMERCIAL

## 2020-01-28 VITALS
SYSTOLIC BLOOD PRESSURE: 140 MMHG | HEART RATE: 72 BPM | TEMPERATURE: 98.7 F | WEIGHT: 293 LBS | DIASTOLIC BLOOD PRESSURE: 80 MMHG | HEIGHT: 64 IN | RESPIRATION RATE: 18 BRPM | BODY MASS INDEX: 50.02 KG/M2

## 2020-01-28 PROCEDURE — 11042 DBRDMT SUBQ TIS 1ST 20SQCM/<: CPT

## 2020-01-28 RX ORDER — LIDOCAINE HYDROCHLORIDE 20 MG/ML
JELLY TOPICAL ONCE
Status: DISCONTINUED | OUTPATIENT
Start: 2020-01-28 | End: 2020-01-29 | Stop reason: HOSPADM

## 2020-01-28 ASSESSMENT — PAIN DESCRIPTION - DESCRIPTORS: DESCRIPTORS: JABBING

## 2020-01-28 ASSESSMENT — PAIN DESCRIPTION - LOCATION: LOCATION: LEG

## 2020-01-28 ASSESSMENT — PAIN DESCRIPTION - PAIN TYPE: TYPE: ACUTE PAIN

## 2020-01-28 ASSESSMENT — PAIN DESCRIPTION - FREQUENCY: FREQUENCY: INTERMITTENT

## 2020-01-28 ASSESSMENT — PAIN DESCRIPTION - PROGRESSION: CLINICAL_PROGRESSION: NOT CHANGED

## 2020-01-28 ASSESSMENT — PAIN SCALES - GENERAL: PAINLEVEL_OUTOF10: 0

## 2020-01-28 ASSESSMENT — PAIN DESCRIPTION - ONSET: ONSET: ON-GOING

## 2020-01-28 ASSESSMENT — PAIN DESCRIPTION - ORIENTATION: ORIENTATION: LEFT

## 2020-01-28 NOTE — PROGRESS NOTES
[address] followup visit _______1 week Dr. Aguilar____________________  (Please note your next appointment above and if you are unable to keep, kindly give a 24 hour notice.  Thank you.)     Physician signature:__________________________        If you experience any of the following, please call the [address] [place] [address] during business hours:     * Increase in Pain  * Temperature over 101  * Increase in drainage from your wound  * Drainage with a foul odor  * Bleeding  * Increase in swelling  * Need for compression bandage changes due to slippage, breakthrough drainage.     If you need medical attention outside of the business hours of the [address] [place] [address] Road please contact your PCP or go to the nearest emergency room.                                                                                                                                                              Electronically signed by Navin Kaplan DPM on 1/28/2020 at 11:29 AM

## 2020-02-04 ENCOUNTER — HOSPITAL ENCOUNTER (OUTPATIENT)
Dept: WOUND CARE | Age: 60
Discharge: HOME | End: 2020-02-04
Payer: COMMERCIAL

## 2020-02-04 VITALS
DIASTOLIC BLOOD PRESSURE: 80 MMHG | HEART RATE: 72 BPM | HEIGHT: 64 IN | TEMPERATURE: 98.4 F | RESPIRATION RATE: 16 BRPM | WEIGHT: 293 LBS | SYSTOLIC BLOOD PRESSURE: 140 MMHG | BODY MASS INDEX: 50.02 KG/M2

## 2020-02-04 PROCEDURE — 11042 DBRDMT SUBQ TIS 1ST 20SQCM/<: CPT | Performed by: NURSE PRACTITIONER

## 2020-02-04 PROCEDURE — 11042 DBRDMT SUBQ TIS 1ST 20SQCM/<: CPT

## 2020-02-04 ASSESSMENT — PAIN SCALES - GENERAL: PAINLEVEL_OUTOF10: 0

## 2020-02-04 NOTE — PROGRESS NOTES
Follow-Up Wound Progress Note    Buck Boyle  AGE: 64 y.o. GENDER: female  DOD: 1960  TODAY'S DATE:  2/4/20  Subjective:    Buck Boyle is a 64 y.o. female who presents today for wound check. Wound Etiology :  pressure ulcer: Stage III  Wound Location :  on left lower leg  Pain : {3/10     Abx : Absent       Overall Wound Assessment  Wound is has slightly improved. Size has decreased    Objective:    BP (!) 140/80   Pulse 72   Temp 98.4 °F (36.9 °C) (Oral)   Resp 16   Ht 5' 4\" (1.626 m)   Wt 294 lb (133.4 kg)   BMI 50.46 kg/m²     Wound   serous exudate noted  Errythema - Present    Wound 11/19/19 Leg Left #2 Left Lower Leg Venous Ulcer (Acquired August 2019)  (Active)   Wound Image   11/19/2019 11:36 AM   Dressing Status Clean;Dry; Intact 1/21/2020 10:56 AM   Dressing Changed Changed/New 1/21/2020 10:56 AM   Dressing/Treatment ABD;Pharmaceutical agent (see MAR); Other (comment) 1/21/2020 10:56 AM   Wound Cleansed Rinsed/Irrigated with saline; Other (Comment) 1/21/2020 10:56 AM   Wound Length (cm) 2 cm 2/4/2020  3:17 PM   Wound Width (cm) 1 cm 2/4/2020  3:17 PM   Wound Depth (cm) 0.2 cm 2/4/2020  3:17 PM   Wound Surface Area (cm^2) 2 cm^2 2/4/2020  3:17 PM   Change in Wound Size % (l*w) 99.61 2/4/2020  3:17 PM   Wound Volume (cm^3) 0.4 cm^3 2/4/2020  3:17 PM   Wound Healing % 100 2/4/2020  3:17 PM   Post-Procedure Length (cm) 2 cm 2/4/2020  4:07 PM   Post-Procedure Width (cm) 1 cm 2/4/2020  4:07 PM   Post-Procedure Depth (cm) 0.3 cm 2/4/2020  4:07 PM   Post-Procedure Surface Area (cm^2) 2 cm^2 2/4/2020  4:07 PM   Post-Procedure Volume (cm^3) 0.6 cm^3 2/4/2020  4:07 PM   Wound Assessment Drainage;Pink;Yellow 2/4/2020  3:17 PM   Drainage Amount Scant 2/4/2020  3:17 PM   Drainage Description Serous; Yellow 2/4/2020  3:17 PM   Odor None 2/4/2020  3:17 PM   Raquel-wound Assessment Dry;Pink 2/4/2020  3:17 PM   Number of days: 77            Assessment:     Please refer to nursing measurements and

## 2020-02-11 ENCOUNTER — HOSPITAL ENCOUNTER (OUTPATIENT)
Dept: WOUND CARE | Age: 60
Discharge: HOME | End: 2020-02-11
Payer: COMMERCIAL

## 2020-02-11 VITALS
RESPIRATION RATE: 16 BRPM | SYSTOLIC BLOOD PRESSURE: 138 MMHG | DIASTOLIC BLOOD PRESSURE: 80 MMHG | TEMPERATURE: 98.1 F | HEART RATE: 74 BPM

## 2020-02-11 PROCEDURE — 11042 DBRDMT SUBQ TIS 1ST 20SQCM/<: CPT

## 2020-02-11 PROCEDURE — 11042 DBRDMT SUBQ TIS 1ST 20SQCM/<: CPT | Performed by: NURSE PRACTITIONER

## 2020-02-11 RX ORDER — LIDOCAINE HYDROCHLORIDE 40 MG/ML
SOLUTION TOPICAL ONCE
Status: DISCONTINUED | OUTPATIENT
Start: 2020-02-11 | End: 2020-02-12 | Stop reason: HOSPADM

## 2020-02-11 ASSESSMENT — PAIN SCALES - GENERAL: PAINLEVEL_OUTOF10: 0

## 2020-02-11 NOTE — PROGRESS NOTES
Wound check - Care provided includes removal of existing dressing and visual inspection    Procedure: Debridement Note: Wound # 2. At 0.4 cm sq. The patient was placed in the sitting position. Lidocaine  gauze was applied  at beginning of wound evaluation. An  Excisional Debridement was performed. Using a curette ,  the wound was debrided sharply of all fibrotic, necrotic, and hyperkeratotic tissue, including a layer of surrounding healthy tissue to stimulate epithelization. The wound was excised through the level of the subcutaneous Wound Percentage debrided is 100%. Please refer to Nurses notes for pre and post debridement dimensions. Wound was irrigated with normal saline solution. Bleeding was with a small amount of bleeding, and controlled with pressure. Patient tolerated procedure well and was given proper instruction. Diagnosis: pressure ulcer: Stage III                    Active Problems:    Ulcer of left lower leg, with fat layer exposed ([address]    Venous ulcer of both lower extremities with varicose veins (HCC)  Resolved Problems:    * No resolved hospital problems. *    Plan:      Treatment & Plan: 1.Excisional Debridement                              2. Collagen                              3. Discussed appropriate home care of this wound. 4. Patient instructions were given. 5. Follow Up in 1 week(s).                                  JOS Mclaughlin CNP   2/11/20     4:32 PM

## 2020-02-17 ENCOUNTER — HOSPITAL ENCOUNTER (OUTPATIENT)
Dept: WOUND CARE | Age: 60
Discharge: HOME | End: 2020-02-17
Payer: COMMERCIAL

## 2020-02-17 VITALS
SYSTOLIC BLOOD PRESSURE: 138 MMHG | DIASTOLIC BLOOD PRESSURE: 90 MMHG | TEMPERATURE: 98.3 F | HEART RATE: 48 BPM | RESPIRATION RATE: 16 BRPM

## 2020-02-17 PROCEDURE — 99213 OFFICE O/P EST LOW 20 MIN: CPT

## 2020-02-17 PROCEDURE — 99213 OFFICE O/P EST LOW 20 MIN: CPT | Performed by: SURGERY

## 2020-02-17 RX ORDER — LIDOCAINE HYDROCHLORIDE 40 MG/ML
SOLUTION TOPICAL ONCE
Status: DISCONTINUED | OUTPATIENT
Start: 2020-02-17 | End: 2020-02-18 | Stop reason: HOSPADM

## 2020-02-17 ASSESSMENT — PAIN SCALES - GENERAL: PAINLEVEL_OUTOF10: 0

## 2020-02-17 NOTE — PLAN OF CARE
Problem: Wound:  Goal: Will show signs of wound healing; wound closure and no evidence of infection  Description  Will show signs of wound healing; wound closure and no evidence of infection  2/17/2020 1520 by Whitney Sanabria RN  Outcome: Ongoing  2/17/2020 1505 by Whitney Sanabria RN  Outcome: Ongoing

## 2020-02-17 NOTE — PROGRESS NOTES
stockings. Continue collagen. Wound/Ulcer Pain Timing/Severity: intermittent  Quality of pain: sharp  Severity:  5 / 10   Modifying Factors: Pain worsens with debridement, pressure  Associated Signs/Symptoms: edema    Ulcer Identification:  Ulcer Type: venous  Contributing Factors: edema, venous stasis and lymphedema    Diabetic/Pressure/Non Pressure Ulcers only:  Ulcer: Non-Pressure ulcer, fat layer exposed    Wound: N/A        PAST MEDICAL HISTORY      Diagnosis Date    Diabetes mellitus ([facility] [address]     Hypertension     Lumbago     Mononeuritis     Obesity     Pain in right foot     Sciatica, right side     Varicose veins of left lower extremities with other complications      Past Surgical History:   Procedure Laterality Date     SECTION      VASCULAR SURGERY      Vein Laser      Family History   Problem Relation Age of Onset    High Blood Pressure Mother     Other Father      Social History     Tobacco Use    Smoking status: Never Smoker    Smokeless tobacco: Never Used   Substance Use Topics    Alcohol use: No     Comment: Social     Drug use: No     Allergies   Allergen Reactions    Acetaminophen     Doxycycline     Nsaids      Bleeding stomach    Iodine     Keflex [Cephalexin] Hives    Zithromax [Azithromycin] Hives     Current Outpatient Medications on File Prior to Encounter   Medication Sig Dispense Refill    metFORMIN (GLUCOPHAGE) 1000 MG tablet Take 1 tablet by mouth 2 times daily (with meals) 180 tablet 0    SITagliptin (JANUVIA) 100 MG tablet Take 1 tablet by mouth daily 90 tablet 0    amLODIPine (NORVASC) 5 MG tablet Take 1 tablet by mouth daily 90 tablet 0    valsartan-hydrochlorothiazide (DIOVAN-HCT) 320-12.5 MG per tablet Take 320 tablets by mouth daily 90 tablet 0    glimepiride (AMARYL) 4 MG tablet Take 1 tablet by mouth every morning 90 tablet 0    gentamicin (GARAMYCIN) 0.1 % ointment Apply topically daily Apply topically daily.       terbinafine (LAMISIL) 1 % Change in Wound Size % (l*w) 99.95 2/17/2020  2:50 PM   Wound Volume (cm^3) 0.02 cm^3 2/17/2020  2:50 PM   Wound Healing % 100 2/17/2020  2:50 PM   Post-Procedure Length (cm) 1 cm 2/11/2020  3:54 PM   Post-Procedure Width (cm) 0.4 cm 2/11/2020  3:54 PM   Post-Procedure Depth (cm) 0.2 cm 2/11/2020  3:54 PM   Post-Procedure Surface Area (cm^2) 0.4 cm^2 2/11/2020  3:54 PM   Post-Procedure Volume (cm^3) 0.08 cm^3 2/11/2020  3:54 PM   Wound Assessment Yellow;Dry 2/17/2020  2:50 PM   Drainage Amount Small 2/17/2020  2:50 PM   Drainage Description Serous; Yellow 2/17/2020  2:50 PM   Odor None 2/17/2020  2:50 PM   Raquel-wound Assessment Dry;Pink 2/17/2020  2:50 PM   Number of days: 90          Procedure Note  Indications:  Based on my examination of this patient's wound(s)/ulcer(s) today, debridement is not required to promote healing and evaluate the wound base. Performed by: Maddy Hernandez MD    Percent of Wound/Ulcer Debrided: n/a    Total Surface Area Debrided:  <n/asq cm     Estimated Blood Loss:  Minimal  Hemostasis Achieved:  by pressure    Procedural Pain:  4  / 10   Post Procedural Pain:  4 / 10     Response to treatment:  Well tolerated by patient. Plan:   Treatment Note please see attached Discharge Instructions    Written patient dismissal instructions given to patient and signed by patient or POA. Discharge Instructions       Visit Discharge/Physician Orders     Discharge condition: stable     Assessment of pain at discharge:none      Anesthetic used: lidocaine     Discharge to: home     Left via private vehicle     Accompanied by: self     ECF/HHA: order supplies through [employer]   Dressing Orders: right medial ankle wound newly healed. Protect from injury and may want to apply dry dressing to protect skin. Earlyne Lord left leg with normal saline and begin to apply plain collagen, cut product to fit inside wound bed.  Moisten collagen with few drops of saline at application to allow product to

## 2020-02-25 ENCOUNTER — HOSPITAL ENCOUNTER (OUTPATIENT)
Dept: WOUND CARE | Age: 60
Discharge: HOME | End: 2020-02-25
Payer: COMMERCIAL

## 2020-02-25 VITALS
HEART RATE: 60 BPM | WEIGHT: 293 LBS | HEIGHT: 64 IN | BODY MASS INDEX: 50.02 KG/M2 | SYSTOLIC BLOOD PRESSURE: 134 MMHG | DIASTOLIC BLOOD PRESSURE: 70 MMHG | RESPIRATION RATE: 6 BRPM | TEMPERATURE: 98.2 F

## 2020-02-25 PROCEDURE — 99212 OFFICE O/P EST SF 10 MIN: CPT

## 2020-02-25 PROCEDURE — 99213 OFFICE O/P EST LOW 20 MIN: CPT | Performed by: NURSE PRACTITIONER

## 2020-02-25 ASSESSMENT — PAIN SCALES - GENERAL: PAINLEVEL_OUTOF10: 0

## 2020-03-27 ENCOUNTER — HOSPITAL ENCOUNTER (OUTPATIENT)
Dept: WOUND CARE | Age: 60
Discharge: HOME | End: 2020-03-27
Payer: COMMERCIAL

## 2020-03-27 VITALS
HEART RATE: 72 BPM | BODY MASS INDEX: 50.02 KG/M2 | RESPIRATION RATE: 16 BRPM | SYSTOLIC BLOOD PRESSURE: 130 MMHG | HEIGHT: 64 IN | TEMPERATURE: 97.7 F | DIASTOLIC BLOOD PRESSURE: 60 MMHG | WEIGHT: 293 LBS

## 2020-03-27 PROBLEM — L13.9 BULLOUS DERMATOSES: Status: ACTIVE | Noted: 2020-03-27

## 2020-03-27 PROCEDURE — 99213 OFFICE O/P EST LOW 20 MIN: CPT

## 2020-03-27 PROCEDURE — 99203 OFFICE O/P NEW LOW 30 MIN: CPT | Performed by: FAMILY MEDICINE

## 2020-03-27 NOTE — PROGRESS NOTES
[address] [facility] Comprehensive History and Physical      CHIEF COMPLAINT:  Small bullae about the dorsal feet and the toe intertriginous regions. Reminiscent of a pemphigus type appearance. Started after using Aquaphor      Chief Complaint   Patient presents with    Wound Check     BILATERAL FEET          The Story of the Wound    The patient is a 64 y.o. female patient of Aimee Crigler, DO  who presents with  a ulceration due to topical therapy wound which is located on the foot Current symptoms include blistering. Pain is rated 1/10. Past Medical History:    Past Medical History:   Diagnosis Date    Diabetes mellitus ([address]     Hypertension     Lumbago     Mononeuritis     Obesity     Pain in right foot     Sciatica, right side     Varicose veins of left lower extremities with other complications        Past Surgical History:    Past Surgical History:   Procedure Laterality Date     SECTION      VASCULAR SURGERY      Vein Laser        Allergies:    Acetaminophen; Doxycycline; Nsaids;  Iodine; Keflex [cephalexin]; and Zithromax [azithromycin]    Labs:   CBC with Differential:    Lab Results   Component Value Date    WBC 7.1 2020    RBC 4.97 2020    HGB 13.2 2020    HCT 43.3 2020     2020    MCV 87.1 2020    MCH 26.6 2020    MCHC 30.5 2020    RDW 14.9 2020     CMP:    Lab Results   Component Value Date     2020    K 4.2 2020    CL 99 2020    CO2 26 2020    BUN 17 2020    CREATININE 0.6 2020    GFRAA >60 2020    LABGLOM >60 2020    GLUCOSE 214 2020    GLUCOSE 87 2011    PROT 8.6 2020    LABALBU 4.2 2020    CALCIUM 9.6 2020    BILITOT 0.4 2020    ALKPHOS 96 2020    AST 19 2020    ALT 20 2020     BMP:    Lab Results   Component Value Date     2020    K 4.2 2020    CL 99 2020    CO2 26 2020

## 2020-04-07 ENCOUNTER — HOSPITAL ENCOUNTER (OUTPATIENT)
Dept: WOUND CARE | Age: 60
Discharge: HOME | End: 2020-04-07
Payer: COMMERCIAL

## 2021-01-07 DIAGNOSIS — E11.9 TYPE 2 DIABETES MELLITUS WITHOUT COMPLICATION, WITHOUT LONG-TERM CURRENT USE OF INSULIN (HCC): ICD-10-CM

## 2021-01-07 DIAGNOSIS — E78.2 MIXED HYPERLIPIDEMIA: ICD-10-CM

## 2021-01-07 LAB
ALBUMIN SERPL-MCNC: 4.1 G/DL (ref 3.5–5.2)
ALP BLD-CCNC: 79 U/L (ref 35–104)
ALT SERPL-CCNC: 47 U/L (ref 0–32)
ANION GAP SERPL CALCULATED.3IONS-SCNC: 8 MMOL/L (ref 7–16)
AST SERPL-CCNC: 42 U/L (ref 0–31)
BILIRUB SERPL-MCNC: 0.3 MG/DL (ref 0–1.2)
BUN BLDV-MCNC: 15 MG/DL (ref 6–20)
CALCIUM SERPL-MCNC: 9.4 MG/DL (ref 8.6–10.2)
CHLORIDE BLD-SCNC: 99 MMOL/L (ref 98–107)
CHOLESTEROL, TOTAL: 218 MG/DL (ref 0–199)
CO2: 29 MMOL/L (ref 22–29)
CREAT SERPL-MCNC: 0.7 MG/DL (ref 0.5–1)
GFR AFRICAN AMERICAN: >60
GFR NON-AFRICAN AMERICAN: >60 ML/MIN/1.73
GLUCOSE BLD-MCNC: 246 MG/DL (ref 74–99)
HBA1C MFR BLD: 8.7 % (ref 4–5.6)
HDLC SERPL-MCNC: 48 MG/DL
LDL CHOLESTEROL CALCULATED: 113 MG/DL (ref 0–99)
POTASSIUM SERPL-SCNC: 3.9 MMOL/L (ref 3.5–5)
SODIUM BLD-SCNC: 136 MMOL/L (ref 132–146)
TOTAL PROTEIN: 8.1 G/DL (ref 6.4–8.3)
TRIGL SERPL-MCNC: 286 MG/DL (ref 0–149)
VLDLC SERPL CALC-MCNC: 57 MG/DL

## 2021-08-23 ENCOUNTER — HOSPITAL ENCOUNTER (EMERGENCY)
Age: 61
Discharge: HOME | End: 2021-08-23
Attending: STUDENT IN AN ORGANIZED HEALTH CARE EDUCATION/TRAINING PROGRAM
Payer: COMMERCIAL

## 2021-08-23 VITALS
SYSTOLIC BLOOD PRESSURE: 155 MMHG | HEIGHT: 64 IN | DIASTOLIC BLOOD PRESSURE: 83 MMHG | BODY MASS INDEX: 49.51 KG/M2 | TEMPERATURE: 98.1 F | RESPIRATION RATE: 16 BRPM | WEIGHT: 290 LBS | OXYGEN SATURATION: 97 % | HEART RATE: 97 BPM

## 2021-08-23 DIAGNOSIS — L02.419 CELLULITIS AND ABSCESS OF LEG: Primary | ICD-10-CM

## 2021-08-23 DIAGNOSIS — L03.119 CELLULITIS AND ABSCESS OF LEG: Primary | ICD-10-CM

## 2021-08-23 PROCEDURE — 6370000000 HC RX 637 (ALT 250 FOR IP): Performed by: STUDENT IN AN ORGANIZED HEALTH CARE EDUCATION/TRAINING PROGRAM

## 2021-08-23 PROCEDURE — 99283 EMERGENCY DEPT VISIT LOW MDM: CPT

## 2021-08-23 RX ORDER — CLINDAMYCIN HYDROCHLORIDE 150 MG/1
450 CAPSULE ORAL ONCE
Status: COMPLETED | OUTPATIENT
Start: 2021-08-23 | End: 2021-08-23

## 2021-08-23 RX ORDER — CLINDAMYCIN HYDROCHLORIDE 150 MG/1
450 CAPSULE ORAL 3 TIMES DAILY
Qty: 63 CAPSULE | Refills: 0 | Status: SHIPPED | OUTPATIENT
Start: 2021-08-23 | End: 2021-08-30

## 2021-08-23 RX ADMIN — CLINDAMYCIN HYDROCHLORIDE 450 MG: 150 CAPSULE ORAL at 22:35

## 2021-08-23 ASSESSMENT — PAIN DESCRIPTION - LOCATION: LOCATION: LEG

## 2021-08-23 ASSESSMENT — PAIN DESCRIPTION - ORIENTATION: ORIENTATION: UPPER

## 2021-08-24 NOTE — ED PROVIDER NOTES
Department of Emergency Medicine   ED  Provider Note  Admit Date/RoomTime: 2021  9:45 PM  ED Room: 10/10          History of Present Illness:  21, Time: 10:36 PM EDT  Chief Complaint   Patient presents with    Abscess     right upper thigh. states area burst area earlier today  + bleeing and foul odor          America Gordon is a 63 y.o. female presenting to the ED for abscess, beginning several days ago. The complaint has been persistent, mild in severity, and worsened by nothing. The patient is a 72-year-old female diabetic who presents to the emergency department complaining of an abscess. Patient symptoms were sudden in onset several days ago, has been persistently worsening, mild in severity, and nothing makes it better or worse. She did not take any thing for symptoms prior to arrival.  The patient states that she was shopping earlier today and she noticed an area burst on her upper thigh and it was bleeding and there was a foul odor present from this area. She states that she has never had abscess like this in the past.  She states that her blood sugars have been well controlled. She denies any fever, chills, chest pain, shortness of breath, nausea, vomiting, abdominal pain, difficulty with urination, dizziness, lightheadedness, syncope, recent hospitalization, recent illness, or other acute symptoms or concerns. Review of Systems:   Pertinent positives and negatives are stated within HPI, all other systems reviewed and are negative.        --------------------------------------------- PAST HISTORY ---------------------------------------------  Past Medical History:  has a past medical history of Diabetes mellitus ([address] Hypertension, Lumbago, Mononeuritis, Obesity, Pain in right foot, Sciatica, right side, and Varicose veins of left lower extremities with other complications.     Past Surgical History:  has a past surgical history that includes  section and vascular surgery. Social History:  reports that she has never smoked. She has never used smokeless tobacco. She reports that she does not drink alcohol and does not use drugs. Family History: family history includes High Blood Pressure in her mother; Other in her father. . Unless otherwise noted, family history is non contributory    The patients home medications have been reviewed. Allergies: Acetaminophen, Doxycycline, Nsaids, Iodine, Keflex [cephalexin], Metoprolol, and Zithromax [azithromycin]    I have reviewed the past medical history, past surgical history, social history, and family history    ---------------------------------------------------PHYSICAL EXAM--------------------------------------    Constitutional/General: Alert and oriented x3, obese, chronically ill appearing, no acute distress  Head: Normocephalic and atraumatic  Eyes:  EOMI, sclera non icteric  Mouth: Oropharynx clear, handling secretions, no trismus, no asymmetry of the posterior oropharynx or uvular edema  Neck: Supple, full ROM, no stridor, no meningeal signs  Respiratory: Lungs clear to auscultation bilaterally, no wheezes, rales, or rhonchi. Not in respiratory distress  Cardiovascular:  Regular rate. Regular rhythm. No murmurs, no aortic murmurs, no gallops, or rubs. 2+ distal pulses. Equal extremity pulses. Chest: No chest wall tenderness  Gastrointestinal:  Abdomen Soft, Non tender, Non distended. No rebound, guarding, or rigidity. No pulsatile masses. Musculoskeletal: Moves all extremities x 4. Warm and well perfused, no clubbing, no cyanosis, no edema. Capillary refill <3 seconds  Skin: skin warm and dry. There is a small 2 cm abscess to the right upper thigh with surrounding erythema. It is already actively draining bloody and purulent discharge. There is no leftover induration or fluctuance at this time. There is no involvement of the perineium.   Neurologic: GCS 15, no focal deficits, symmetric strength 5/5 in the upper and lower extremities bilaterally  Psychiatric: Normal Affect          -------------------------------------------------- RESULTS -------------------------------------------------  I have personally reviewed all laboratory and imaging results for this patient. Results are listed below. LABS: (Lab results interpreted by me)  No results found for this visit on 08/23/21.,       RADIOLOGY:  Interpreted by Radiologist unless otherwise specified  No orders to display       ------------------------- NURSING NOTES AND VITALS REVIEWED ---------------------------   The nursing notes within the ED encounter and vital signs as below have been reviewed by myself  BP (!) 155/83   Pulse 97   Temp 98.1 °F (36.7 °C) (Oral)   Resp 16   Ht 5' 4\" (1.626 m)   Wt 290 lb (131.5 kg)   SpO2 97%   BMI 49.78 kg/m²     Oxygen Saturation Interpretation: 97 % on room air. Normal    The patients available past medical records and past encounters were reviewed. ------------------------------ ED COURSE/MEDICAL DECISION MAKING----------------------  Medications   clindamycin (CLEOCIN) capsule 450 mg (450 mg Oral Given 8/23/21 2235)           The cardiac monitor revealed NSR with a heart rate in the 90s as interpreted by me. The cardiac monitor was ordered secondary to the patient's pain and to monitor the patient for dysrhythmia. [facility] [id number]           Medical Decision Making:     The patient is a 53-year-old female who presents to the emergency department complaining of an abscess. She is hemodynamically stable, nontoxic in appearance, and in no acute distress. There is an abscess that is ruptured over the right upper thigh along with a surrounding cellulitis. There is relative induration or fluctuance. There is no involvement of the perineium. States that her blood sugars have been normal.  She has not tried any outpatient antibiotics at this time.   However, she does have multiple allergies to antibiotics including doxycycline and Keflex. She was given a prescription for clindamycin. She was provided with her first dose of antibiotic in the emergency department. Recommended her to use Tylenol or ibuprofen at home for pain control. She is to follow-up with her family doctor within the next week for a wound check. She states that she is traveling to [facility] in the morning for her son's wedding. She is to return here immediately for any fevers, increased blood sugars, increased redness, worsening symptoms, or other acute symptoms or concerns. Patient verbalized understanding and agreement to treatment plan discharge instructions. Re-Evaluations:       Patient was resting comfortably on reassessment and in no acute distress. This patient's ED course included: a personal history and physicial examination and re-evaluation prior to disposition    This patient has remained hemodynamically stable during their ED course. Counseling: The emergency provider has spoken with the patient and discussed todays results, in addition to providing specific details for the plan of care and counseling regarding the diagnosis and prognosis. Questions are answered at this time and they are agreeable with the plan.       --------------------------------- IMPRESSION AND DISPOSITION ---------------------------------    IMPRESSION  1. Cellulitis and abscess of leg        DISPOSITION  Disposition: Discharge to home  Patient condition is stable        NOTE: This report was transcribed using voice recognition software.  Every effort was made to ensure accuracy; however, inadvertent computerized transcription errors may be present       Jennifer Roach DO  Resident  08/24/21 1146

## 2021-10-16 ENCOUNTER — HOSPITAL ENCOUNTER (OUTPATIENT)
Dept: MAMMOGRAPHY | Age: 61
Discharge: HOME | End: 2021-10-18
Payer: COMMERCIAL

## 2021-10-16 ENCOUNTER — HOSPITAL ENCOUNTER (OUTPATIENT)
Age: 61
Discharge: HOME | End: 2021-10-16
Payer: COMMERCIAL

## 2021-10-16 DIAGNOSIS — Z12.31 ENCOUNTER FOR SCREENING MAMMOGRAM FOR MALIGNANT NEOPLASM OF BREAST: ICD-10-CM

## 2021-10-16 DIAGNOSIS — Z00.00 ROUTINE GENERAL MEDICAL EXAMINATION AT A HEALTH CARE FACILITY: ICD-10-CM

## 2021-10-16 DIAGNOSIS — E11.9 TYPE 2 DIABETES MELLITUS WITHOUT COMPLICATION, WITHOUT LONG-TERM CURRENT USE OF INSULIN (HCC): ICD-10-CM

## 2021-10-16 LAB
ALBUMIN SERPL-MCNC: 4.1 G/DL (ref 3.5–5.2)
ALP BLD-CCNC: 102 U/L (ref 35–104)
ALT SERPL-CCNC: 53 U/L (ref 0–32)
ANION GAP SERPL CALCULATED.3IONS-SCNC: 12 MMOL/L (ref 7–16)
AST SERPL-CCNC: 45 U/L (ref 0–31)
BILIRUB SERPL-MCNC: 0.3 MG/DL (ref 0–1.2)
BUN BLDV-MCNC: 14 MG/DL (ref 6–20)
CALCIUM SERPL-MCNC: 9.3 MG/DL (ref 8.6–10.2)
CHLORIDE BLD-SCNC: 99 MMOL/L (ref 98–107)
CHOLESTEROL, TOTAL: 227 MG/DL (ref 0–199)
CO2: 27 MMOL/L (ref 22–29)
CREAT SERPL-MCNC: 0.6 MG/DL (ref 0.5–1)
CREATININE URINE: 207 MG/DL (ref 29–226)
GFR AFRICAN AMERICAN: >60
GFR NON-AFRICAN AMERICAN: >60 ML/MIN/1.73
GLUCOSE BLD-MCNC: 263 MG/DL (ref 74–99)
HBA1C MFR BLD: 10.5 % (ref 4–5.6)
HCT VFR BLD CALC: 42.6 % (ref 34–48)
HDLC SERPL-MCNC: 49 MG/DL
HEMOGLOBIN: 13.5 G/DL (ref 11.5–15.5)
LDL CHOLESTEROL CALCULATED: 126 MG/DL (ref 0–99)
MCH RBC QN AUTO: 27.4 PG (ref 26–35)
MCHC RBC AUTO-ENTMCNC: 31.7 % (ref 32–34.5)
MCV RBC AUTO: 86.4 FL (ref 80–99.9)
MICROALBUMIN UR-MCNC: 159 MG/L
MICROALBUMIN/CREAT UR-RTO: 76.8 (ref 0–30)
PDW BLD-RTO: 13.9 FL (ref 11.5–15)
PLATELET # BLD: 275 E9/L (ref 130–450)
PMV BLD AUTO: 9.6 FL (ref 7–12)
POTASSIUM SERPL-SCNC: 4.2 MMOL/L (ref 3.5–5)
RBC # BLD: 4.93 E12/L (ref 3.5–5.5)
SODIUM BLD-SCNC: 138 MMOL/L (ref 132–146)
TOTAL PROTEIN: 8.1 G/DL (ref 6.4–8.3)
TRIGL SERPL-MCNC: 261 MG/DL (ref 0–149)
TSH SERPL DL<=0.05 MIU/L-ACNC: 4.48 UIU/ML (ref 0.27–4.2)
VITAMIN D 25-HYDROXY: 26 NG/ML (ref 30–100)
VLDLC SERPL CALC-MCNC: 52 MG/DL
WBC # BLD: 6.9 E9/L (ref 4.5–11.5)

## 2021-10-16 PROCEDURE — 82044 UR ALBUMIN SEMIQUANTITATIVE: CPT

## 2021-10-16 PROCEDURE — 80061 LIPID PANEL: CPT

## 2021-10-16 PROCEDURE — 80053 COMPREHEN METABOLIC PANEL: CPT

## 2021-10-16 PROCEDURE — 84443 ASSAY THYROID STIM HORMONE: CPT

## 2021-10-16 PROCEDURE — 83036 HEMOGLOBIN GLYCOSYLATED A1C: CPT

## 2021-10-16 PROCEDURE — 85027 COMPLETE CBC AUTOMATED: CPT

## 2021-10-16 PROCEDURE — 82570 ASSAY OF URINE CREATININE: CPT

## 2021-10-16 PROCEDURE — 82306 VITAMIN D 25 HYDROXY: CPT

## 2021-10-16 PROCEDURE — 77067 SCR MAMMO BI INCL CAD: CPT

## 2021-10-16 PROCEDURE — 36415 COLL VENOUS BLD VENIPUNCTURE: CPT

## 2022-07-29 DIAGNOSIS — E11.9 TYPE 2 DIABETES MELLITUS WITHOUT COMPLICATION, WITHOUT LONG-TERM CURRENT USE OF INSULIN (HCC): ICD-10-CM

## 2022-07-29 LAB
ANION GAP SERPL CALCULATED.3IONS-SCNC: 16 MMOL/L (ref 7–16)
BUN BLDV-MCNC: 11 MG/DL (ref 6–23)
CALCIUM SERPL-MCNC: 9.5 MG/DL (ref 8.6–10.2)
CHLORIDE BLD-SCNC: 100 MMOL/L (ref 98–107)
CO2: 24 MMOL/L (ref 22–29)
CREAT SERPL-MCNC: 0.6 MG/DL (ref 0.5–1)
GFR AFRICAN AMERICAN: >60
GFR NON-AFRICAN AMERICAN: >60 ML/MIN/1.73
GLUCOSE BLD-MCNC: 203 MG/DL (ref 74–99)
HBA1C MFR BLD: 11.6 % (ref 4–5.6)
POTASSIUM SERPL-SCNC: 4.6 MMOL/L (ref 3.5–5)
SODIUM BLD-SCNC: 140 MMOL/L (ref 132–146)

## 2022-08-26 ENCOUNTER — TELEPHONE (OUTPATIENT)
Dept: INTERNAL MEDICINE | Age: 62
End: 2022-08-26

## 2022-08-26 NOTE — TELEPHONE ENCOUNTER
Request made from diabetes education to reach out to pt re: possible [employer] financial assistance towards diabetes education classes. Spoke with pt by phone; she reports she checked with her self pay insurance and she has no coverage for diabetes education.   Explained [employer] financial assistance process and she will return call with info re: gross income to evaluate

## 2022-10-08 ENCOUNTER — HOSPITAL ENCOUNTER (EMERGENCY)
Age: 62
Discharge: HOME | End: 2022-10-08
Payer: COMMERCIAL

## 2022-10-08 VITALS
TEMPERATURE: 98.5 F | SYSTOLIC BLOOD PRESSURE: 183 MMHG | DIASTOLIC BLOOD PRESSURE: 113 MMHG | HEART RATE: 88 BPM | RESPIRATION RATE: 18 BRPM | OXYGEN SATURATION: 98 %

## 2022-10-08 DIAGNOSIS — S61.011A LACERATION OF RIGHT THUMB WITHOUT FOREIGN BODY WITHOUT DAMAGE TO NAIL, INITIAL ENCOUNTER: Primary | ICD-10-CM

## 2022-10-08 PROCEDURE — 99284 EMERGENCY DEPT VISIT MOD MDM: CPT

## 2022-10-08 PROCEDURE — 6370000000 HC RX 637 (ALT 250 FOR IP): Performed by: PHYSICIAN ASSISTANT

## 2022-10-08 PROCEDURE — 90714 TD VACC NO PRESV 7 YRS+ IM: CPT | Performed by: PHYSICIAN ASSISTANT

## 2022-10-08 PROCEDURE — 6360000002 HC RX W HCPCS: Performed by: PHYSICIAN ASSISTANT

## 2022-10-08 PROCEDURE — 90471 IMMUNIZATION ADMIN: CPT | Performed by: PHYSICIAN ASSISTANT

## 2022-10-08 RX ORDER — AMLODIPINE BESYLATE 5 MG/1
5 TABLET ORAL ONCE
Status: COMPLETED | OUTPATIENT
Start: 2022-10-08 | End: 2022-10-08

## 2022-10-08 RX ORDER — TETANUS AND DIPHTHERIA TOXOIDS ADSORBED 2; 2 [LF]/.5ML; [LF]/.5ML
0.5 INJECTION INTRAMUSCULAR ONCE
Status: COMPLETED | OUTPATIENT
Start: 2022-10-08 | End: 2022-10-08

## 2022-10-08 RX ORDER — OXYCODONE HYDROCHLORIDE AND ACETAMINOPHEN 5; 325 MG/1; MG/1
1 TABLET ORAL ONCE
Status: COMPLETED | OUTPATIENT
Start: 2022-10-08 | End: 2022-10-08

## 2022-10-08 RX ORDER — HYDROCODONE BITARTRATE AND ACETAMINOPHEN 5; 325 MG/1; MG/1
1 TABLET ORAL EVERY 6 HOURS PRN
Qty: 6 TABLET | Refills: 0 | Status: SHIPPED | OUTPATIENT
Start: 2022-10-08 | End: 2022-10-11

## 2022-10-08 RX ORDER — VALSARTAN 80 MG/1
320 TABLET ORAL ONCE
Status: COMPLETED | OUTPATIENT
Start: 2022-10-08 | End: 2022-10-08

## 2022-10-08 RX ORDER — CARVEDILOL 6.25 MG/1
25 TABLET ORAL ONCE
Status: COMPLETED | OUTPATIENT
Start: 2022-10-08 | End: 2022-10-08

## 2022-10-08 RX ADMIN — OXYCODONE AND ACETAMINOPHEN 1 TABLET: 5; 325 TABLET ORAL at 17:26

## 2022-10-08 RX ADMIN — CARVEDILOL 25 MG: 6.25 TABLET, FILM COATED ORAL at 17:25

## 2022-10-08 RX ADMIN — VALSARTAN 320 MG: 80 TABLET ORAL at 17:24

## 2022-10-08 RX ADMIN — TETANUS AND DIPHTHERIA TOXOIDS ADSORBED 0.5 ML: 2; 2 INJECTION INTRAMUSCULAR at 17:23

## 2022-10-08 RX ADMIN — AMLODIPINE BESYLATE 5 MG: 5 TABLET ORAL at 17:25

## 2022-10-08 NOTE — DISCHARGE INSTRUCTIONS
Skin Avulsion      WHAT YOU NEED TO KNOW:              Skin avulsion is a wound that happens when skin is torn from your body during an accident or other injury. The torn skin may be lost or too damaged to be repaired, and it must be removed. A wound of this type cannot be stitched closed because there is tissue missing. Avulsion wounds are usually bigger and have more scars because of the missing tissue. DISCHARGE INSTRUCTIONS:    Medicines:  Antibiotic ointment: Your primary healthcare provider may tell you to gently rub a topical antibiotic ointment on your wound. This will help prevent an infection and help your wound heal faster. Pain medicine: You may be given medicine to take away or decrease pain. Do not wait until the pain is severe before you take your medicine. NSAIDs , such as ibuprofen, help decrease swelling, pain, and fever. This medicine is available with or without a doctor's order. NSAIDs can cause stomach bleeding or kidney problems in certain people. If you take blood thinner medicine, always ask if NSAIDs are safe for you. Always read the medicine label and follow directions. Do not give these medicines to children under 10months of age without direction from your child's healthcare provider. Take your medicine as directed. Call your healthcare provider if you think your medicine is not helping or if you have side effects. Tell him if you are allergic to any medicine. Keep a list of the medicines, vitamins, and herbs you take. Include the amounts, and when and why you take them. Bring the list or the pill bottles to follow-up visits. Carry your medicine list with you in case of an emergency. Care for your wound:    Avulsion wounds may take longer to heal because they cannot be closed with tape or stitches. Keep your wound clean and protected to prevent infection and speed healing. Clean your wound: Wash your hands with soap and water before and after you care for your wound. You may be able to use a soft cloth to gently clean the wound after the first 24 to 48 hours. After that, gently clean the wound once or twice a day with cool water. Do not soak your wound. Use soap to clean around the wound, but try not to get any on the wound itself. Do not use alcohol or hydrogen peroxide to clean your wound unless you are directed to. Gently pat the area dry and reapply the bandage as directed. Elevate your wound: Prop your injured area on pillows to raise it above the level of your heart. This will help reduce pain and swelling. Do this for 30 minutes at a time, as often as you can. Bandage your wound: Bandages keep your wound clean, dry, and protected from infection. They may also prevent swelling. Use a bandage that does not stick to your wound, and has a spongy layer to absorb fluids. Leave your bandage on as long as directed. Ask your primary healthcare provider when and how to change your bandage. Do not wrap the bandage too tightly. This could cut off blood flow and cause more injury. Use cool compresses: Wet a washcloth or towel with cool water and hold it on your wound as directed. Ask how often to apply the compress and for how long each time. Reduce scarring: Avoid direct sunlight on your wound. Sunlight may burn or change the color of the new skin over your wound. Use sunscreen (SPF 30 or higher) on the new skin for at least 1 year after it heals.

## 2022-10-08 NOTE — ED PROVIDER NOTES
[address]  Department of Emergency Medicine   ED  Encounter Note  Admit Date/RoomTime: 10/8/2022  4:34 PM  ED Room: [place] [address]    NAME: Bert Barillas  : 1960  MRN: [id number]     Chief Complaint:  Laceration (Patient cut her fright finger while chopping food. )    History of Present Illness       Bert Barillas is a 63 y.o. old female presenting to the emergency department by private vehicle, for a laceration to the right thumb, caused by kitchen utensil, which occurred at home approximately 1 hour(s) prior to arrival.  There is not a possibility of retained foreign body in the affected area. Bleeding is  controlled. She takes no blood thinning agents. There is pain at injury site. Tetanus Status:  unknown. ROS   Pertinent positives and negatives are stated within HPI, all other systems reviewed and are negative. Past Medical History:  has a past medical history of Diabetes mellitus ([address] Hypertension, Lumbago, Mononeuritis, Obesity, Pain in right foot, Sciatica, right side, and Varicose veins of left lower extremities with other complications. Surgical History:  has a past surgical history that includes  section and vascular surgery. Social History:  reports that she has never smoked. She has never used smokeless tobacco. She reports that she does not drink alcohol and does not use drugs. Family History: family history includes High Blood Pressure in her mother; Other in her father. Allergies: Doxycycline, Nsaids, Iodine, Keflex [cephalexin], Metoprolol, and Zithromax [azithromycin]    Physical Exam  Physical Exam   Oxygen Saturation Interpretation: Normal.        ED Triage Vitals [10/08/22 1621]   BP Temp Temp src Heart Rate Resp SpO2 Height Weight   -- -- -- 81 19 98 % -- --         Constitutional:  Alert, development consistent with age. HEENT:  NC/NT. Airway patent. Neck:  Normal ROM. Supple. Non-tender. Digits/Fingers:   Right Thumb distal phalanx volar aspect. Tenderness:  severe. .              Swelling: none. Deformity: no deformity observed/palpated. ROM: full range of motion. Skin: There is a full-thickness skin avulsion over the tip of the right thumb with active bleeding. No other wounds noted. .       Neurovascular: Motor deficit: none. Sensory deficit: none. Pulse deficit: none. Capillary refill: normal.  Hand:  Right all metacarpals            Tenderness:  none. Swelling: none. Deformity: no deformity observed/palpated. Skin:  no wounds, erythema, or swelling. Lymphatics: No lymphangitis or adenopathy noted. Neurological:  Oriented. Motor functions intact. Lab / Imaging Results   (All laboratory and radiology results have been personally reviewed by myself)  Labs:  No results found for this visit on 10/08/22. Imaging: All Radiology results interpreted by Radiologist unless otherwise noted. No orders to display     ED Course / Medical Decision Making     Medications   oxyCODONE-acetaminophen (PERCOCET) 5-325 MG per tablet 1 tablet (has no administration in time range)   diptheria-tetanus toxoids [facility]) 2-2 LF/0.5ML injection 0.5 mL (0.5 mLs IntraMUSCular Given 10/8/22 1723)   valsartan (DIOVAN) tablet 320 mg (320 mg Oral Given 10/8/22 1724)   carvedilol (COREG) tablet 25 mg (25 mg Oral Given 10/8/22 1725)   amLODIPine (NORVASC) tablet 5 mg (5 mg Oral Given 10/8/22 1725)        Consult(s):   None    Procedure(s):   PROCEDURE NOTE  10/8/22         Wound hemoatasis REPAIR  Risks, benefits and alternatives (for applicable procedures below) described. Performed By: MAXWELL Vyas. Informed consent: Verbal consent obtained.   The patient was counseled regarding the procedure in person, it's indications, risks, potential complications and alternatives and any questions were answered. Verbal consent was obtained. Laceration #: 1. Location: thumb  The wound area was irrigated with sterile saline and draped in a sterile fashion. Local Anesthesia:  obtained with Lidocaine 1% without epinephrine. The wound was explored with the following results: Thickness: full thickness. no foreign body or tendon injury seen. Debridement: None. Undermining: None. Wound Margins Revised: None. Flaps Aligned: No.  Hemostasis was obtained using a combination of Surgicel and silver nitrate following a digital block. .  Dressing:  a gauze dressing and Coban. There were no additional wounds requiring formal closure. MDM:   Imaging was not obtained based on low suspicion for fracture / bony abnormality, retained foreign bodyas per history/physical findings. Plan of Care/Counseling:  [place], [place] reviewed today's visit with the patient and spouse / life partner in addition to providing specific details for the plan of care and counseling regarding the diagnosis and prognosis. Questions are answered at this time and are agreeable with the plan. Assessment     1. Laceration of right thumb without foreign body without damage to nail, initial encounter      Plan   Discharged home. Patient condition is stable    New Medications     New Prescriptions    No medications on file     Electronically signed by MAXWELL Rodas   DD: 10/8/22  **This report was transcribed using voice recognition software. Every effort was made to ensure accuracy; however, inadvertent computerized transcription errors may be present.   END OF ED PROVIDER NOTE      Favio Syed  10/09/22 8132

## 2022-11-01 DIAGNOSIS — E78.01 FAMILIAL HYPERCHOLESTEROLEMIA: ICD-10-CM

## 2022-11-01 DIAGNOSIS — E11.9 TYPE 2 DIABETES MELLITUS WITHOUT COMPLICATION, WITHOUT LONG-TERM CURRENT USE OF INSULIN (HCC): ICD-10-CM

## 2022-11-01 LAB
ALBUMIN SERPL-MCNC: 4.2 G/DL (ref 3.5–5.2)
ALP BLD-CCNC: 74 U/L (ref 35–104)
ALT SERPL-CCNC: 11 U/L (ref 0–32)
ANION GAP SERPL CALCULATED.3IONS-SCNC: 14 MMOL/L (ref 7–16)
AST SERPL-CCNC: 15 U/L (ref 0–31)
BILIRUB SERPL-MCNC: 0.3 MG/DL (ref 0–1.2)
BUN BLDV-MCNC: 13 MG/DL (ref 6–23)
CALCIUM SERPL-MCNC: 9.7 MG/DL (ref 8.6–10.2)
CHLORIDE BLD-SCNC: 101 MMOL/L (ref 98–107)
CHOLESTEROL, TOTAL: 191 MG/DL (ref 0–199)
CO2: 26 MMOL/L (ref 22–29)
CREAT SERPL-MCNC: 0.6 MG/DL (ref 0.5–1)
GFR SERPL CREATININE-BSD FRML MDRD: >60 ML/MIN/1.73
GLUCOSE BLD-MCNC: 167 MG/DL (ref 74–99)
HBA1C MFR BLD: 8.8 % (ref 4–5.6)
HDLC SERPL-MCNC: 48 MG/DL
LDL CHOLESTEROL CALCULATED: 114 MG/DL (ref 0–99)
POTASSIUM SERPL-SCNC: 4.2 MMOL/L (ref 3.5–5)
SODIUM BLD-SCNC: 141 MMOL/L (ref 132–146)
TOTAL PROTEIN: 7.8 G/DL (ref 6.4–8.3)
TRIGL SERPL-MCNC: 143 MG/DL (ref 0–149)
VLDLC SERPL CALC-MCNC: 29 MG/DL

## 2023-01-10 ENCOUNTER — HOSPITAL ENCOUNTER (OUTPATIENT)
Dept: DIABETES SERVICES | Age: 63
Setting detail: THERAPIES SERIES
Discharge: HOME | End: 2023-01-10
Payer: COMMERCIAL

## 2023-01-10 PROCEDURE — G0109 DIAB MANAGE TRN IND/GROUP: HCPCS

## 2023-01-10 SDOH — ECONOMIC STABILITY: FOOD INSECURITY: ADDITIONAL INFORMATION: NO

## 2023-01-10 ASSESSMENT — PROBLEM AREAS IN DIABETES QUESTIONNAIRE (PAID)
PAID-5 TOTAL SCORE: 13
FEELING SCARED WHEN YOU THINK ABOUT LIVING WITH DIABETES: 3
COPING WITH COMPLICATIONS OF DIABETES: 4
FEELING DEPRESSED WHEN YOU THINK ABOUT LIVING WITH DIABETES: 1
WORRYING ABOUT THE FUTURE AND THE POSSIBILITY OF SERIOUS COMPLICATIONS: 4
FEELING THAT DIABETES IS TAKING UP TOO MUCH OF YOUR MENTAL AND PHYSICAL ENERGY EVERY DAY: 1

## 2023-01-11 ENCOUNTER — HOSPITAL ENCOUNTER (OUTPATIENT)
Dept: DIABETES SERVICES | Age: 63
Setting detail: THERAPIES SERIES
Discharge: HOME | End: 2023-01-11
Payer: COMMERCIAL

## 2023-01-11 PROCEDURE — G0109 DIAB MANAGE TRN IND/GROUP: HCPCS

## 2023-01-11 NOTE — PROGRESS NOTES
Diabetes Self-Management Education Record    Participant Name: Marilyn Pierre  Referring Provider: Karan Hannah DO  Assessment/Evaluation Ratings:  1=Needs Instruction   4=Demonstrates Understanding/Competency  2=Needs Review   NC=Not Covered    3=Comprehends Key Points  N/A=Not Applicable  Topics/Learning Objectives Pre-session Assess Date:  Instructor initials/date  [facility]/1/10/23 Instr. Date    Instructor initials/date  [facility]/1/10/23 Follow-up Post- session Eval Comments   Diabetes disease process & Treatment process:   -Define type of diabetes in simple terms.  - Describe the ABCs of  diabetes management  -Identify own type of diabetes  -Identify lifestyle changes/treatment options  -other:  2 [x] All [facility]/1/10/23    4 1/10/22 Type 2 > 1 year. Developing strategies for Healthy coping/psychosocial issues:    -Describe feelings about living with diabetes  -Identify coping strategies and sources of stress  -Identify support needed & support network available  -Complete PAID-5 Diabetes questionnaire 2 [x] All [facility]/1/10/23      4 Marilyn Pierre completed a Diabetes Self- Management Education Assessment on 1/11/23. Part of our assessment is having the patient complete the PAID (Problem Areas in Diabetes Scale)-5 survey. This tool  measures diabetes-related emotional distress a patient may be feeling. Marilyn Pierre scored _13_    States she is having trouble with the medications for diabetes and other meds prescribed recently and this has caused her a lot of stress. Encouraged to talk with Dr and pharmacists to see if side effects are common.       Prevention, detection & treatment of Chronic complications:    -Identify the prevention, detection and treatment for complications including immunizations, preventive eye, foot, dental and renal exams as indicated per the participant's duration of diabetes and health status.  -Define the natural course of diabetes and the relationship of blood glucose levels to long term complications of diabetes. 2 [x] All [facility]/1/10/23    3 1/10/23 States she has some neuropathy. Prevention, detection & treatment of acute complications:    -State the causes,signs & symptoms of hyper & hypoglycemia, and prevention & treatment strategies.   -Describe sick day guidelines  DKA /indications for ketone testing &  when to call physician  2 [x] All [facility]/1/10/23      3              -Identify severe weather/situation crisis  & diabetes supplies management 2 []      Using medications safely:   -State effects of diabetes medicines on blood glucose levels;  -List diabetes medication taken, action & side effects 1 [x] All [facility]/1/10/23    3 1/10/23 Stopped Actos due to swelling of hands and feet. Metformin 1000mg twice daily;Glimepiride Twice daily   Insulin/Injectables/glucagon  -Name appropriate injection sites; proper storage; supplies needed;  NA   []       Demonstrate proper technique NA []      Monitoring blood glucose, interpreting and using results:   -Identify the purpose of testing   -Identify recommended & personal blood glucose targets & HgbA1C target levels  -State the Importance of logging blood glucose levels for pattern recognition;   -State benefits of reading/using pt generated health data  -Verbalize safe lancet disposal 3 [x] All [facility]/1/10/23      3 1/10/23 Testing 2-3 times a week and shares results with Dr. Stanton Lindsay proper testing technique NA []      Incorporating physical activity into lifestyle:   -State effect of exercise on blood glucose levels;   -State benefits of regular exercise;   -Define safety considerations/food choices if needed.  -Describe contraindications/maintenance of activity. 1 [x] All [facility]/1/10/23      3 1/10/23 States she is not currently active at this time.    Incorporating nutritional management into lifestyle:   -Describe effect of type, amount & timing of food on blood glucose  -Describe methods for preparing and planning   healthy meals  -Correctly read food labels for nutritional values  -Name 3 foods high in Carbohydrate  -Plan a sample 4 carbohydrate-controlled meal using Diabetes Plate Method  -Verbalized ability to measure and count carbohydrate gram servings using food labels and carbohydrate food list.    -Plan a carbohydrate-controlled meal based on individual needs/preferences from a Registered Dietitian.   1 [] All       []    []    []  []      []        []                      Developing strategies for problem solving to promote health/change behavior. -Identify 7 self-care behaviors; Personal health risk factors; Benefits, challenges & strategies for behavioral change and set an individualized goal selection. 1       []   1/10/23 Pre-Class Confidence scale= 2   []Nutrition  []Monitoring  []Exercise  []Medication  []Other     Identified Barriers to learning/adherence to self management plan:    Financial   []  other    Instruction Method:  Lecture/Discussion, Power Point Presentation , Handouts, and Return demonstration     Supporting Education Materials/Equipment Provided: Self-management manual and Other ADA-\"how to [employer]"   []Kinyarwanda materials       [] services     []Other:      Encounter Type Date Attended Start Time End Time Comments No Show Dates   Assessment 1/10/23         Session 1 1/10/23-[facility] [id number]      Session 2        1:1 DSMES          In person Follow-up         Gestational Diabetes         DSMES #3        Meter Instrx        Insulin Instrx           Additional Comments: States insurance does not cover cost of classes.         Date:   Follow-up goal attainment based on patients initial DSMES goal    Dr Notified by [] EMR []Fax        []Post class Hgb A1C  []Medication compliance   []Plate method/meal plan compliance   []Able to state the number of Carbohydrate servings eaten at B,L,D   []Testing blood glucose as prescribed by PCP   []Exercise Routine   []Other:   []Other:     []Patient lost to follow-up  Dr Notified by []EMR []Fax

## 2023-01-11 NOTE — LETTER
[facility])- Diabetes Education Department Diabetes Education Letter    2023       Re:     Mildred Hernandez         :  1960  Dear Dr. Nathaly Mitchell: Thank you for referring your patient, Mildred Hernandez, to [facility]) diabetes education. Mildred Hernandez has completed their personalized comprehensive education plan. The education plan included the following topics:          Diabetes disease process & treatment   Healthy Eating  Being Active  Taking Medication  Monitoring Glucose  Acute and Chronic Complications  Lifestyle and Healthy coping  Diabetes Distress and Support       In addition, the PAID -5 (Problem Areas in Diabetes) Survey was completed. The results From 2023 were 13  A total score of 8 or greater indicates possible diabetes related emotional distress which warrants further assessment. [x] [address]  and Crisis Resource information provided. Mildred Hernandez selected behavioral goal is:    []Healthy Eating  []Monitoring  []Problem Solving  [x]Being Active  []Taking Medication  []Other: We will send a follow-up letter in 3 months to notify you of their progress. Thank you for referring this patient to our program.  Please do not hesitate to call if you have any questions at [phone] [facility]) or [phone] ([address] [place] [address]).         Sincerely,    [facility] Diabetes Education Department  American Diabetes Association Recognized DSMES Program rev. 10/19/22

## 2023-01-12 NOTE — PROGRESS NOTES
Diabetes Self-Management Education Record    Participant Name: Tobi Cintron  Referring Provider: Wendy Davison DO  Assessment/Evaluation Ratings:  1=Needs Instruction   4=Demonstrates Understanding/Competency  2=Needs Review   NC=Not Covered    3=Comprehends Key Points  N/A=Not Applicable  Topics/Learning Objectives Pre-session Assess Date:  Instructor initials/date  [facility]/1/10/23 Instr. Date    Instructor initials/date  [facility]/1/10/23 Follow-up Post- session Eval Comments   Diabetes disease process & Treatment process:   -Define type of diabetes in simple terms.  - Describe the ABCs of  diabetes management  -Identify own type of diabetes  -Identify lifestyle changes/treatment options  -other:  2 [x] All [facility]/1/10/23    4 1/10/22 Type 2 > 1 year. Developing strategies for Healthy coping/psychosocial issues:    -Describe feelings about living with diabetes  -Identify coping strategies and sources of stress  -Identify support needed & support network available  -Complete PAID-5 Diabetes questionnaire 2 [x] All [facility]/1/10/23      4 Tobi Cintron completed a Diabetes Self- Management Education Assessment on 1/11/23. Part of our assessment is having the patient complete the PAID (Problem Areas in Diabetes Scale)-5 survey. This tool  measures diabetes-related emotional distress a patient may be feeling. Tobi Cintron scored _13_    States she is having trouble with the medications for diabetes and other meds prescribed recently and this has caused her a lot of stress. Encouraged to talk with Dr and pharmacists to see if side effects are common.       Prevention, detection & treatment of Chronic complications:    -Identify the prevention, detection and treatment for complications including immunizations, preventive eye, foot, dental and renal exams as indicated per the participant's duration of diabetes and health status.  -Define the natural course of diabetes and the relationship of blood glucose levels to long term complications of diabetes. 2 [x] All [facility]/1/10/23    3 1/10/23 States she has some neuropathy. Prevention, detection & treatment of acute complications:    -State the causes,signs & symptoms of hyper & hypoglycemia, and prevention & treatment strategies.   -Describe sick day guidelines  DKA /indications for ketone testing &  when to call physician  2 [x] All [facility]/1/10/23      3              -Identify severe weather/situation crisis  & diabetes supplies management 2 []      Using medications safely:   -State effects of diabetes medicines on blood glucose levels;  -List diabetes medication taken, action & side effects 1 [x] All [facility]/1/10/23    3 1/10/23 Stopped Actos due to swelling of hands and feet. Metformin 1000mg twice daily;Glimepiride Twice daily   Insulin/Injectables/glucagon  -Name appropriate injection sites; proper storage; supplies needed;  NA   []       Demonstrate proper technique NA []      Monitoring blood glucose, interpreting and using results:   -Identify the purpose of testing   -Identify recommended & personal blood glucose targets & HgbA1C target levels  -State the Importance of logging blood glucose levels for pattern recognition;   -State benefits of reading/using pt generated health data  -Verbalize safe lancet disposal 3 [x] All [facility]/1/10/23      3 1/10/23 Testing 2-3 times a week and shares results with Dr. Pam Muir proper testing technique NA []      Incorporating physical activity into lifestyle:   -State effect of exercise on blood glucose levels;   -State benefits of regular exercise;   -Define safety considerations/food choices if needed.  -Describe contraindications/maintenance of activity. 1 [x] All [facility]/1/10/23      3 1/10/23 States she is not currently active at this time.    Incorporating nutritional management into lifestyle:   -Describe effect of type, amount & timing of food on blood glucose  -Describe methods for preparing and planning   healthy meals  -Correctly read food labels for nutritional values  -Name 3 foods high in Carbohydrate  -Plan a sample 4 carbohydrate-controlled meal using Diabetes Plate Method  -Verbalized ability to measure and count carbohydrate gram servings using food labels and carbohydrate food list.    -Plan a carbohydrate-controlled meal based on individual needs/preferences from a Registered Dietitian.   1 [] All       [x]    [x]    [x]  [x]      [x]        []  3 1/11/23 SE  Reviewed nutritional recommendations for salt, added sugar, fats, and fiber. Reviewed plate method and portion control. Reviewed which foods contain carbohydrates, how carbs impact blood glucose, how to count carbs, and how to spread carbs evenly throughout the day. Label reading reviewed. Patient very attentive to education and demonstrated understanding. Asked questions and took notes. Developing strategies for problem solving to promote health/change behavior. -Identify 7 self-care behaviors; Personal health risk factors; Benefits, challenges & strategies for behavioral change and set an individualized goal selection.  1       [x]  3 1/10/23 Pre-Class Confidence scale= 2   []Nutrition  []Monitoring  [x]Exercise 5-7 days a week for 30 min  []Medication  []Other     Identified Barriers to learning/adherence to self management plan:    Financial   []  other    Instruction Method:  Lecture/Discussion, Power Point Presentation , Handouts, and Return demonstration     Supporting Education Materials/Equipment Provided: Self-management manual and Other ADA-\"how to [employer]"   []Mongolian materials       [] services     []Other:      Encounter Type Date Attended Start Time End Time Comments No Show Dates   Assessment 1/10/23         Session 1 1/10/23[facility] 1730 1830      Session 2 1/11/23  1730 1830     1:1 DSMES          In person Follow-up         Gestational Diabetes         DSMES #3        Meter Instrx        Insulin Instrx Additional Comments: States insurance does not cover cost of classes.         Date:   Follow-up goal attainment based on patients initial DSMES goal    Dr Notified by [] EMR []Fax        []Post class Hgb A1C  []Medication compliance   []Plate method/meal plan compliance   []Able to state the number of Carbohydrate servings eaten at B,L,D   []Testing blood glucose as prescribed by PCP   []Exercise Routine   []Other:   []Other:     []Patient lost to follow-up  Dr Notified by []EMR []Fax

## 2023-01-25 ENCOUNTER — HOSPITAL ENCOUNTER (OUTPATIENT)
Dept: DIABETES SERVICES | Age: 63
Setting detail: THERAPIES SERIES
End: 2023-01-25
Payer: COMMERCIAL

## 2023-02-01 ENCOUNTER — HOSPITAL ENCOUNTER (OUTPATIENT)
Dept: DIABETES SERVICES | Age: 63
Setting detail: THERAPIES SERIES
Discharge: HOME | End: 2023-02-01
Payer: COMMERCIAL

## 2023-02-01 PROCEDURE — G0108 DIAB MANAGE TRN  PER INDIV: HCPCS

## 2023-02-01 NOTE — PROGRESS NOTES
Diabetes Self-Management Education Record    Participant Name: Grace Page  Referring Provider: Ha Kincaid DO  Assessment/Evaluation Ratings:  1=Needs Instruction   4=Demonstrates Understanding/Competency  2=Needs Review   NC=Not Covered    3=Comprehends Key Points  N/A=Not Applicable  Topics/Learning Objectives Pre-session Assess Date:  Instructor initials/date  [facility]/1/10/23 Instr. Date    Instructor initials/date  [facility]/1/10/23 Follow-up Post- session Eval Comments   Diabetes disease process & Treatment process:   -Define type of diabetes in simple terms.  - Describe the ABCs of  diabetes management  -Identify own type of diabetes  -Identify lifestyle changes/treatment options  -other:  2 [x] All [facility]/1/10/23    4 1/10/22 Type 2 > 1 year. Developing strategies for Healthy coping/psychosocial issues:    -Describe feelings about living with diabetes  -Identify coping strategies and sources of stress  -Identify support needed & support network available  -Complete PAID-5 Diabetes questionnaire 2 [x] All [facility]/1/10/23      4 Grace Page completed a Diabetes Self- Management Education Assessment on 1/11/23. Part of our assessment is having the patient complete the PAID (Problem Areas in Diabetes Scale)-5 survey. This tool  measures diabetes-related emotional distress a patient may be feeling. Grace Page scored _13_    States she is having trouble with the medications for diabetes and other meds prescribed recently and this has caused her a lot of stress. Encouraged to talk with Dr and pharmacists to see if side effects are common.       Prevention, detection & treatment of Chronic complications:    -Identify the prevention, detection and treatment for complications including immunizations, preventive eye, foot, dental and renal exams as indicated per the participant's duration of diabetes and health status.  -Define the natural course of diabetes and the relationship of blood glucose levels to long term complications of diabetes. 2 [x] All [facility]/1/10/23    3 1/10/23 States she has some neuropathy. Prevention, detection & treatment of acute complications:    -State the causes,signs & symptoms of hyper & hypoglycemia, and prevention & treatment strategies.   -Describe sick day guidelines  DKA /indications for ketone testing &  when to call physician  2 [x] All [facility]/1/10/23      3              -Identify severe weather/situation crisis  & diabetes supplies management 2 []      Using medications safely:   -State effects of diabetes medicines on blood glucose levels;  -List diabetes medication taken, action & side effects 1 [x] All [facility]/1/10/23    3 1/10/23 Stopped Actos due to swelling of hands and feet. Metformin 1000mg twice daily;Glimepiride Twice daily   Insulin/Injectables/glucagon  -Name appropriate injection sites; proper storage; supplies needed;  NA   []       Demonstrate proper technique NA []      Monitoring blood glucose, interpreting and using results:   -Identify the purpose of testing   -Identify recommended & personal blood glucose targets & HgbA1C target levels  -State the Importance of logging blood glucose levels for pattern recognition;   -State benefits of reading/using pt generated health data  -Verbalize safe lancet disposal 3 [x] All [facility]/1/10/23      3 1/10/23 Testing 2-3 times a week and shares results with Dr. Linda Venegas proper testing technique NA []      Incorporating physical activity into lifestyle:   -State effect of exercise on blood glucose levels;   -State benefits of regular exercise;   -Define safety considerations/food choices if needed.  -Describe contraindications/maintenance of activity. 1 [x] All [facility]/1/10/23      3 1/10/23 States she is not currently active at this time.    Incorporating nutritional management into lifestyle:   -Describe effect of type, amount & timing of food on blood glucose  -Describe methods for preparing and planning   healthy meals  -Correctly read food labels for nutritional values  -Name 3 foods high in Carbohydrate  -Plan a sample 4 carbohydrate-controlled meal using Diabetes Plate Method  -Verbalized ability to measure and count carbohydrate gram servings using food labels and carbohydrate food list.    -Plan a carbohydrate-controlled meal based on individual needs/preferences from a Registered Dietitian.   1 [] All       [x]    [x]    [x]  [x]      [x]        [x]  3/3 1/11/23 SE  Reviewed nutritional recommendations for salt, added sugar, fats, and fiber. Reviewed plate method and portion control. Reviewed which foods contain carbohydrates, how carbs impact blood glucose, how to count carbs, and how to spread carbs evenly throughout the day. Label reading reviewed. Patient very attentive to education and demonstrated understanding. Asked questions and took notes. 2/1/23  Reviewed CHO counting trifold and label reading. Pt instructed on , 3 meals  1 Snack(s). Pt was able to plan meal using food models and serving size sheet. Developing strategies for problem solving to promote health/change behavior. -Identify 7 self-care behaviors; Personal health risk factors; Benefits, challenges & strategies for behavioral change and set an individualized goal selection.  1       [x]  3 1/10/23 Pre-Class Confidence scale= 2   []Nutrition  []Monitoring  [x]Exercise 5-7 days a week for 30 min  []Medication  []Other     Identified Barriers to learning/adherence to self management plan:    Financial   []  other    Instruction Method:  Lecture/Discussion, Power Point Presentation , Handouts, and Return demonstration     Supporting Education Materials/Equipment Provided: Self-management manual and Other ADA-\"how to [employer]"   []Portuguese materials       [] services     []Other:      Encounter Type Date Attended Start Time End Time Comments No Show Dates   Assessment 1/10/23         Session 1 1/10/23-[facility] [id number] Session 2  Session 3 1/11/23 SE  2/1/23 SE 1730  1400 1830  1500     1:1 DSMES          In person Follow-up         Gestational Diabetes         DSMES #3        Meter Instrx        Insulin Instrx           Additional Comments: [place] insurance does not cover cost of classes. 2/1/23 Patient came with daughter.       Date:   Follow-up goal attainment based on patients initial DSMES goal    Dr Notified by [] EMR []Fax        []Post class Hgb A1C  []Medication compliance   []Plate method/meal plan compliance   []Able to state the number of Carbohydrate servings eaten at B,L,D   []Testing blood glucose as prescribed by PCP   []Exercise Routine   []Other:   []Other:     []Patient lost to follow-up  Dr Notified by []EMR []Fax

## 2023-07-17 ENCOUNTER — HOSPITAL ENCOUNTER (EMERGENCY)
Age: 63
Discharge: HOME | End: 2023-07-18
Payer: COMMERCIAL

## 2023-07-17 ENCOUNTER — APPOINTMENT (OUTPATIENT)
Dept: GENERAL RADIOLOGY | Age: 63
End: 2023-07-17
Payer: COMMERCIAL

## 2023-07-17 DIAGNOSIS — J18.9 PNEUMONIA DUE TO INFECTIOUS ORGANISM, UNSPECIFIED LATERALITY, UNSPECIFIED PART OF LUNG: Primary | ICD-10-CM

## 2023-07-17 DIAGNOSIS — J02.9 ACUTE PHARYNGITIS, UNSPECIFIED ETIOLOGY: ICD-10-CM

## 2023-07-17 DIAGNOSIS — R50.9 FEVER, UNSPECIFIED FEVER CAUSE: ICD-10-CM

## 2023-07-17 LAB
FLUAV RNA RESP QL NAA+PROBE: NOT DETECTED
FLUBV RNA RESP QL NAA+PROBE: NOT DETECTED
SARS-COV-2 RNA RESP QL NAA+PROBE: NOT DETECTED
SOURCE: NORMAL
SPECIMEN DESCRIPTION: NORMAL
SPECIMEN SOURCE: NORMAL
STREP A, MOLECULAR: NEGATIVE

## 2023-07-17 PROCEDURE — 83605 ASSAY OF LACTIC ACID: CPT

## 2023-07-17 PROCEDURE — 96365 THER/PROPH/DIAG IV INF INIT: CPT

## 2023-07-17 PROCEDURE — 87636 SARSCOV2 & INF A&B AMP PRB: CPT

## 2023-07-17 PROCEDURE — 99285 EMERGENCY DEPT VISIT HI MDM: CPT

## 2023-07-17 PROCEDURE — 80053 COMPREHEN METABOLIC PANEL: CPT

## 2023-07-17 PROCEDURE — 87086 URINE CULTURE/COLONY COUNT: CPT

## 2023-07-17 PROCEDURE — 81001 URINALYSIS AUTO W/SCOPE: CPT

## 2023-07-17 PROCEDURE — 71045 X-RAY EXAM CHEST 1 VIEW: CPT

## 2023-07-17 PROCEDURE — 96366 THER/PROPH/DIAG IV INF ADDON: CPT

## 2023-07-17 PROCEDURE — 85027 COMPLETE CBC AUTOMATED: CPT

## 2023-07-17 RX ORDER — ACETAMINOPHEN 500 MG
1000 TABLET ORAL ONCE
Status: COMPLETED | OUTPATIENT
Start: 2023-07-17 | End: 2023-07-18

## 2023-07-17 RX ORDER — IBUPROFEN 600 MG/1
600 TABLET ORAL ONCE
Status: COMPLETED | OUTPATIENT
Start: 2023-07-17 | End: 2023-07-18

## 2023-07-17 RX ORDER — ONDANSETRON 4 MG/1
4 TABLET, ORALLY DISINTEGRATING ORAL ONCE
Status: COMPLETED | OUTPATIENT
Start: 2023-07-17 | End: 2023-07-18

## 2023-07-17 ASSESSMENT — PAIN - FUNCTIONAL ASSESSMENT
PAIN_FUNCTIONAL_ASSESSMENT: PREVENTS OR INTERFERES SOME ACTIVE ACTIVITIES AND ADLS
PAIN_FUNCTIONAL_ASSESSMENT: 0-10

## 2023-07-17 ASSESSMENT — LIFESTYLE VARIABLES
HOW MANY STANDARD DRINKS CONTAINING ALCOHOL DO YOU HAVE ON A TYPICAL DAY: PATIENT DOES NOT DRINK
HOW OFTEN DO YOU HAVE A DRINK CONTAINING ALCOHOL: NEVER

## 2023-07-17 ASSESSMENT — PAIN SCALES - GENERAL: PAINLEVEL_OUTOF10: 8

## 2023-07-17 ASSESSMENT — PAIN DESCRIPTION - PAIN TYPE: TYPE: ACUTE PAIN

## 2023-07-17 ASSESSMENT — PAIN DESCRIPTION - DESCRIPTORS: DESCRIPTORS: SORE

## 2023-07-17 ASSESSMENT — PAIN DESCRIPTION - LOCATION: LOCATION: THROAT

## 2023-07-18 VITALS
SYSTOLIC BLOOD PRESSURE: 114 MMHG | TEMPERATURE: 98.4 F | DIASTOLIC BLOOD PRESSURE: 61 MMHG | OXYGEN SATURATION: 94 % | RESPIRATION RATE: 22 BRPM | HEART RATE: 82 BPM | WEIGHT: 270 LBS | BODY MASS INDEX: 46.1 KG/M2 | HEIGHT: 64 IN

## 2023-07-18 PROBLEM — J18.9 PNEUMONIA DUE TO INFECTIOUS ORGANISM: Status: ACTIVE | Noted: 2023-07-18

## 2023-07-18 PROBLEM — J02.9 ACUTE PHARYNGITIS: Status: ACTIVE | Noted: 2023-07-18

## 2023-07-18 PROBLEM — R50.9 FEVER: Status: ACTIVE | Noted: 2023-07-18

## 2023-07-18 LAB
ALBUMIN SERPL-MCNC: 4.2 G/DL (ref 3.5–5.2)
ALP SERPL-CCNC: 70 U/L (ref 35–104)
ALT SERPL-CCNC: 14 U/L (ref 0–32)
ANION GAP SERPL CALCULATED.3IONS-SCNC: 14 MMOL/L (ref 7–16)
AST SERPL-CCNC: 19 U/L (ref 0–31)
ATYPICAL LYMPHOCYTE ABSOLUTE COUNT: 0.59 K/UL (ref 0–0.46)
ATYPICAL LYMPHOCYTES: 10 % (ref 0–4)
BACTERIA URNS QL MICRO: ABNORMAL
BASOPHILS # BLD: 0 K/UL (ref 0–0.2)
BASOPHILS NFR BLD: 0 % (ref 0–2)
BILIRUB SERPL-MCNC: 0.4 MG/DL (ref 0–1.2)
BILIRUB UR QL STRIP: NEGATIVE
BUN SERPL-MCNC: 15 MG/DL (ref 6–23)
CALCIUM SERPL-MCNC: 9 MG/DL (ref 8.6–10.2)
CASTS #/AREA URNS LPF: ABNORMAL /LPF
CASTS #/AREA URNS LPF: ABNORMAL /LPF
CHLORIDE SERPL-SCNC: 93 MMOL/L (ref 98–107)
CLARITY UR: ABNORMAL
CO2 SERPL-SCNC: 26 MMOL/L (ref 22–29)
COLOR UR: YELLOW
CREAT SERPL-MCNC: 0.9 MG/DL (ref 0.5–1)
D DIMER: 803 NG/ML DDU (ref 0–232)
EKG ATRIAL RATE: 90 BPM
EKG P AXIS: 40 DEGREES
EKG P-R INTERVAL: 170 MS
EKG Q-T INTERVAL: 362 MS
EKG QRS DURATION: 100 MS
EKG QTC CALCULATION (BAZETT): 442 MS
EKG R AXIS: -9 DEGREES
EKG T AXIS: 44 DEGREES
EKG VENTRICULAR RATE: 90 BPM
EOSINOPHIL # BLD: 0 K/UL (ref 0.05–0.5)
EOSINOPHILS RELATIVE PERCENT: 0 % (ref 0–6)
EPI CELLS #/AREA URNS HPF: ABNORMAL /HPF
ERYTHROCYTE [DISTWIDTH] IN BLOOD BY AUTOMATED COUNT: 14.3 % (ref 11.5–15)
GFR SERPL CREATININE-BSD FRML MDRD: >60 ML/MIN/1.73M2
GLUCOSE SERPL-MCNC: 228 MG/DL (ref 74–99)
GLUCOSE UR STRIP-MCNC: 100 MG/DL
HCT VFR BLD AUTO: 40.1 % (ref 34–48)
HGB BLD-MCNC: 12.9 G/DL (ref 11.5–15.5)
HGB UR QL STRIP.AUTO: NEGATIVE
KETONES UR STRIP-MCNC: 15 MG/DL
LACTATE BLDV-SCNC: 1.4 MMOL/L (ref 0.5–2.2)
LEUKOCYTE ESTERASE UR QL STRIP: NEGATIVE
LYMPHOCYTES # BLD: 8 % (ref 20–42)
LYMPHOCYTES NFR BLD: 0.47 K/UL (ref 1.5–4)
MCH RBC QN AUTO: 27.2 PG (ref 26–35)
MCHC RBC AUTO-ENTMCNC: 32.2 G/DL (ref 32–34.5)
MCV RBC AUTO: 84.6 FL (ref 80–99.9)
METAMYELOCYTES ABSOLUTE COUNT: 0.06 K/UL (ref 0–0.12)
METAMYELOCYTES: 1 % (ref 0–1)
MONOCYTES NFR BLD: 0.12 K/UL (ref 0.1–0.95)
MONOCYTES NFR BLD: 2 % (ref 2–12)
NEUTROPHILS NFR BLD: 79 % (ref 43–80)
NEUTS SEG NFR BLD: 4.66 K/UL (ref 1.8–7.3)
NITRITE UR QL STRIP: NEGATIVE
PH UR STRIP: 5 [PH] (ref 5–9)
PLATELET # BLD AUTO: 214 K/UL (ref 130–450)
PMV BLD AUTO: 9.9 FL (ref 7–12)
POTASSIUM SERPL-SCNC: 3.3 MMOL/L (ref 3.5–5)
PROT SERPL-MCNC: 8.4 G/DL (ref 6.4–8.3)
PROT UR STRIP-MCNC: 100 MG/DL
RBC # BLD AUTO: 4.74 M/UL (ref 3.5–5.5)
RBC #/AREA URNS HPF: ABNORMAL /HPF
SODIUM SERPL-SCNC: 133 MMOL/L (ref 132–146)
SP GR UR STRIP: >1.03 (ref 1–1.03)
UROBILINOGEN UR STRIP-ACNC: 0.2 EU/DL (ref 0–1)
WBC #/AREA URNS HPF: ABNORMAL /HPF
WBC OTHER # BLD: 5.9 K/UL (ref 4.5–11.5)

## 2023-07-18 PROCEDURE — 2580000003 HC RX 258: Performed by: EMERGENCY MEDICINE

## 2023-07-18 PROCEDURE — 93010 ELECTROCARDIOGRAM REPORT: CPT | Performed by: INTERNAL MEDICINE

## 2023-07-18 PROCEDURE — 93005 ELECTROCARDIOGRAM TRACING: CPT | Performed by: EMERGENCY MEDICINE

## 2023-07-18 PROCEDURE — 6370000000 HC RX 637 (ALT 250 FOR IP): Performed by: EMERGENCY MEDICINE

## 2023-07-18 PROCEDURE — 85379 FIBRIN DEGRADATION QUANT: CPT

## 2023-07-18 PROCEDURE — 6360000002 HC RX W HCPCS: Performed by: EMERGENCY MEDICINE

## 2023-07-18 PROCEDURE — 87040 BLOOD CULTURE FOR BACTERIA: CPT

## 2023-07-18 PROCEDURE — 96365 THER/PROPH/DIAG IV INF INIT: CPT

## 2023-07-18 PROCEDURE — 6370000000 HC RX 637 (ALT 250 FOR IP): Performed by: NURSE PRACTITIONER

## 2023-07-18 PROCEDURE — 96366 THER/PROPH/DIAG IV INF ADDON: CPT

## 2023-07-18 RX ORDER — IPRATROPIUM BROMIDE AND ALBUTEROL SULFATE 2.5; .5 MG/3ML; MG/3ML
1 SOLUTION RESPIRATORY (INHALATION) ONCE
Status: COMPLETED | OUTPATIENT
Start: 2023-07-18 | End: 2023-07-18

## 2023-07-18 RX ORDER — ACETAMINOPHEN 325 MG/1
650 TABLET ORAL ONCE
Status: DISCONTINUED | OUTPATIENT
Start: 2023-07-18 | End: 2023-07-18

## 2023-07-18 RX ORDER — ONDANSETRON 2 MG/ML
4 INJECTION INTRAMUSCULAR; INTRAVENOUS ONCE
Status: DISCONTINUED | OUTPATIENT
Start: 2023-07-18 | End: 2023-07-18

## 2023-07-18 RX ORDER — LEVOFLOXACIN 500 MG/1
500 TABLET, FILM COATED ORAL DAILY
Qty: 10 TABLET | Refills: 0 | Status: SHIPPED | OUTPATIENT
Start: 2023-07-18 | End: 2023-07-28

## 2023-07-18 RX ORDER — LEVOFLOXACIN 5 MG/ML
750 INJECTION, SOLUTION INTRAVENOUS ONCE
Status: COMPLETED | OUTPATIENT
Start: 2023-07-18 | End: 2023-07-18

## 2023-07-18 RX ORDER — 0.9 % SODIUM CHLORIDE 0.9 %
1000 INTRAVENOUS SOLUTION INTRAVENOUS ONCE
Status: COMPLETED | OUTPATIENT
Start: 2023-07-18 | End: 2023-07-18

## 2023-07-18 RX ADMIN — ACETAMINOPHEN 1000 MG: 500 TABLET ORAL at 01:05

## 2023-07-18 RX ADMIN — LEVOFLOXACIN 750 MG: 5 INJECTION, SOLUTION INTRAVENOUS at 02:32

## 2023-07-18 RX ADMIN — IPRATROPIUM BROMIDE AND ALBUTEROL SULFATE 1 DOSE: .5; 3 SOLUTION RESPIRATORY (INHALATION) at 02:32

## 2023-07-18 RX ADMIN — POTASSIUM BICARBONATE 40 MEQ: 782 TABLET, EFFERVESCENT ORAL at 03:33

## 2023-07-18 RX ADMIN — SODIUM CHLORIDE 1000 ML: 9 INJECTION, SOLUTION INTRAVENOUS at 02:28

## 2023-07-18 RX ADMIN — IBUPROFEN 600 MG: 600 TABLET, FILM COATED ORAL at 01:06

## 2023-07-18 RX ADMIN — ONDANSETRON 4 MG: 4 TABLET, ORALLY DISINTEGRATING ORAL at 01:04

## 2023-07-18 ASSESSMENT — PAIN DESCRIPTION - ORIENTATION: ORIENTATION: RIGHT;LEFT

## 2023-07-18 ASSESSMENT — PAIN DESCRIPTION - DESCRIPTORS: DESCRIPTORS: ACHING;TENDER;SORE;DISCOMFORT

## 2023-07-18 ASSESSMENT — PAIN SCALES - GENERAL: PAINLEVEL_OUTOF10: 10

## 2023-07-18 ASSESSMENT — PAIN DESCRIPTION - LOCATION: LOCATION: THROAT;EAR

## 2023-07-18 ASSESSMENT — PAIN - FUNCTIONAL ASSESSMENT
PAIN_FUNCTIONAL_ASSESSMENT: NONE - DENIES PAIN
PAIN_FUNCTIONAL_ASSESSMENT: NONE - DENIES PAIN

## 2023-07-18 NOTE — ED NOTES
Department of Emergency Medicine  FIRST PROVIDER TRIAGE NOTE             Independent MLP           7/17/23  10:21 PM EDT    Date of Encounter: 7/17/23   MRN: [id number]      HPI: Kelly Everett is a 66 y.o. female who presents to the ED for Fever (Fever, sore throat, and vomiting- placed on Bactrim per PMD, symptoms not getting any better/)       ROS: Negative for cp or sob. PE: Gen Appearance/Constitutional: alert  HEENT: NC/NT. PERRLA,  Airway patent. CV: regular rate     Initial Plan of Care: All treatment areas with department are currently occupied. Plan to order/Initiate the following while awaiting opening in ED: labs and imaging studies.   Initiate Treatment-Testing, Proceed toTreatment Area When Bed Available for ED Attending/MLP to Continue Care    Electronically signed by JOS Martin CNP   DD: 7/17/23         JOS Peters CNP  07/17/23 [id number]

## 2023-07-18 NOTE — DISCHARGE INSTRUCTIONS
If increased chest pain shortness of breath leg swelling or calf pain or any signs of a pulmonary emboli or clot in your legs close follow-up

## 2023-07-21 LAB
MICROORGANISM SPEC CULT: ABNORMAL
MICROORGANISM SPEC CULT: ABNORMAL
SPECIMEN DESCRIPTION: ABNORMAL

## 2023-07-23 LAB
MICROORGANISM SPEC CULT: NORMAL
MICROORGANISM SPEC CULT: NORMAL
SERVICE CMNT-IMP: NORMAL
SERVICE CMNT-IMP: NORMAL
SPECIMEN DESCRIPTION: NORMAL
SPECIMEN DESCRIPTION: NORMAL

## 2024-08-07 ENCOUNTER — HOSPITAL ENCOUNTER (EMERGENCY)
Age: 64
Discharge: HOME | End: 2024-08-07
Attending: EMERGENCY MEDICINE
Payer: COMMERCIAL

## 2024-08-07 ENCOUNTER — APPOINTMENT (OUTPATIENT)
Dept: GENERAL RADIOLOGY | Age: 64
End: 2024-08-07
Payer: COMMERCIAL

## 2024-08-07 VITALS
BODY MASS INDEX: 50.02 KG/M2 | OXYGEN SATURATION: 95 % | TEMPERATURE: 98.6 F | WEIGHT: 293 LBS | RESPIRATION RATE: 18 BRPM | DIASTOLIC BLOOD PRESSURE: 94 MMHG | SYSTOLIC BLOOD PRESSURE: 183 MMHG | HEIGHT: 64 IN | HEART RATE: 95 BPM

## 2024-08-07 DIAGNOSIS — I10 ASYMPTOMATIC HYPERTENSION: Primary | ICD-10-CM

## 2024-08-07 LAB
ALBUMIN SERPL-MCNC: 4.2 G/DL (ref 3.5–5.2)
ALP SERPL-CCNC: 110 U/L (ref 35–104)
ALT SERPL-CCNC: 28 U/L (ref 0–32)
ANION GAP SERPL CALCULATED.3IONS-SCNC: 17 MMOL/L (ref 7–16)
AST SERPL-CCNC: 22 U/L (ref 0–31)
BASOPHILS # BLD: 0.04 K/UL (ref 0–0.2)
BASOPHILS NFR BLD: 1 % (ref 0–2)
BILIRUB SERPL-MCNC: 0.3 MG/DL (ref 0–1.2)
BNP SERPL-MCNC: 98 PG/ML (ref 0–125)
BUN SERPL-MCNC: 19 MG/DL (ref 6–23)
CALCIUM SERPL-MCNC: 10.2 MG/DL (ref 8.6–10.2)
CHLORIDE SERPL-SCNC: 97 MMOL/L (ref 98–107)
CO2 SERPL-SCNC: 25 MMOL/L (ref 22–29)
CREAT SERPL-MCNC: 0.6 MG/DL (ref 0.5–1)
EOSINOPHIL # BLD: 0.04 K/UL (ref 0.05–0.5)
EOSINOPHILS RELATIVE PERCENT: 1 % (ref 0–6)
ERYTHROCYTE [DISTWIDTH] IN BLOOD BY AUTOMATED COUNT: 13.4 % (ref 11.5–15)
GFR, ESTIMATED: >90 ML/MIN/1.73M2
GLUCOSE SERPL-MCNC: 315 MG/DL (ref 74–99)
HCT VFR BLD AUTO: 41.3 % (ref 34–48)
HGB BLD-MCNC: 13.2 G/DL (ref 11.5–15.5)
IMM GRANULOCYTES # BLD AUTO: <0.03 K/UL (ref 0–0.58)
IMM GRANULOCYTES NFR BLD: 0 % (ref 0–5)
LYMPHOCYTES NFR BLD: 1.36 K/UL (ref 1.5–4)
LYMPHOCYTES RELATIVE PERCENT: 22 % (ref 20–42)
MCH RBC QN AUTO: 26.9 PG (ref 26–35)
MCHC RBC AUTO-ENTMCNC: 32 G/DL (ref 32–34.5)
MCV RBC AUTO: 84.1 FL (ref 80–99.9)
MONOCYTES NFR BLD: 0.35 K/UL (ref 0.1–0.95)
MONOCYTES NFR BLD: 6 % (ref 2–12)
NEUTROPHILS NFR BLD: 72 % (ref 43–80)
NEUTS SEG NFR BLD: 4.53 K/UL (ref 1.8–7.3)
PLATELET # BLD AUTO: 310 K/UL (ref 130–450)
PMV BLD AUTO: 9.6 FL (ref 7–12)
POTASSIUM SERPL-SCNC: 3.9 MMOL/L (ref 3.5–5)
PROT SERPL-MCNC: 8.3 G/DL (ref 6.4–8.3)
RBC # BLD AUTO: 4.91 M/UL (ref 3.5–5.5)
SODIUM SERPL-SCNC: 139 MMOL/L (ref 132–146)
TROPONIN I SERPL HS-MCNC: 10 NG/L (ref 0–9)
TROPONIN I SERPL HS-MCNC: 13 NG/L (ref 0–9)
TSH SERPL DL<=0.05 MIU/L-ACNC: 2.05 UIU/ML (ref 0.27–4.2)
WBC OTHER # BLD: 6.3 K/UL (ref 4.5–11.5)

## 2024-08-07 PROCEDURE — 84484 ASSAY OF TROPONIN QUANT: CPT

## 2024-08-07 PROCEDURE — 84443 ASSAY THYROID STIM HORMONE: CPT

## 2024-08-07 PROCEDURE — 93005 ELECTROCARDIOGRAM TRACING: CPT | Performed by: EMERGENCY MEDICINE

## 2024-08-07 PROCEDURE — 71045 X-RAY EXAM CHEST 1 VIEW: CPT

## 2024-08-07 PROCEDURE — 80053 COMPREHEN METABOLIC PANEL: CPT

## 2024-08-07 PROCEDURE — 83880 ASSAY OF NATRIURETIC PEPTIDE: CPT

## 2024-08-07 PROCEDURE — 85025 COMPLETE CBC W/AUTO DIFF WBC: CPT

## 2024-08-07 PROCEDURE — 99285 EMERGENCY DEPT VISIT HI MDM: CPT

## 2024-08-07 RX ORDER — ENALAPRILAT 1.25 MG/ML
1.25 INJECTION INTRAVENOUS ONCE
Status: DISCONTINUED | OUTPATIENT
Start: 2024-08-07 | End: 2024-08-07

## 2024-08-07 ASSESSMENT — PAIN - FUNCTIONAL ASSESSMENT: PAIN_FUNCTIONAL_ASSESSMENT: NONE - DENIES PAIN

## 2024-08-07 NOTE — ED PROVIDER NOTES
central and bibasilar subsegmental atelectasis.     No acute cardiopulmonary findings.       No results found.    PROCEDURES   none          CRITICAL CARE TIME (.cct)   none    PAST MEDICAL HISTORY/Chronic Conditions Affecting Care      has a past medical history of Diabetes mellitus (HCC), Hypertension, Lumbago, Mononeuritis, Obesity, Pain in right foot, Sciatica, right side, and Varicose veins of left lower extremities with other complications.     EMERGENCY DEPARTMENT COURSE    Vitals:    Vitals:    08/07/24 1529 08/07/24 1538 08/07/24 1610 08/07/24 1732   BP: (!) 213/115  (!) 184/85 (!) 183/94   Pulse: (!) 120  (!) 102 95   Resp: 20  18 18   Temp: 98.6 °F (37 °C)      TempSrc: Oral      SpO2: 97%  97% 95%   Weight:  133.1 kg (293 lb 6.4 oz)     Height:  1.626 m (5' 4\")         Patient was given the following medications:  Medications - No data to display        Is this patient to be included in the SEP-1 Core Measure due to severe sepsis or septic shock?   No Exclusion criteria - the patient is NOT to be included for SEP-1 Core Measure due to: Infection is not suspected        Medical Decision Making/Differential Diagnosis:    CC/HPI Summary, Social Determinants of health, Records Reviewed, DDx, testing done/not done, ED Course, Reassessment, disposition considerations/shared decision making with patient, consults, disposition:            MDM:   Workup essentially negative for acute findings.  Patient's blood pressure coming down significantly and she still remains asymptomatic.  She does maintain that she does not take her medication as prescribed, particularly her clonidine all the time as prescribed.  She did just take the clonidine here before going home because she says its time for her to take it.  I feel there might be a component of rebound hypertension and tachycardia related to clonidine noncompliance versus other medication noncompliance.  She will discuss with her primary care physician and

## 2024-08-08 LAB
EKG ATRIAL RATE: 93 BPM
EKG P AXIS: 61 DEGREES
EKG P-R INTERVAL: 194 MS
EKG Q-T INTERVAL: 358 MS
EKG QRS DURATION: 108 MS
EKG QTC CALCULATION (BAZETT): 445 MS
EKG R AXIS: -33 DEGREES
EKG T AXIS: 42 DEGREES
EKG VENTRICULAR RATE: 93 BPM

## 2024-08-08 PROCEDURE — 93010 ELECTROCARDIOGRAM REPORT: CPT | Performed by: INTERNAL MEDICINE
